# Patient Record
Sex: FEMALE | Race: WHITE | NOT HISPANIC OR LATINO | Employment: OTHER | URBAN - METROPOLITAN AREA
[De-identification: names, ages, dates, MRNs, and addresses within clinical notes are randomized per-mention and may not be internally consistent; named-entity substitution may affect disease eponyms.]

---

## 2020-01-01 ENCOUNTER — APPOINTMENT (OUTPATIENT)
Dept: GENERAL RADIOLOGY | Facility: HOSPITAL | Age: 85
End: 2020-01-01

## 2020-01-01 ENCOUNTER — APPOINTMENT (OUTPATIENT)
Dept: CT IMAGING | Facility: HOSPITAL | Age: 85
End: 2020-01-01

## 2020-01-01 ENCOUNTER — APPOINTMENT (OUTPATIENT)
Dept: CARDIOLOGY | Facility: HOSPITAL | Age: 85
End: 2020-01-01

## 2020-01-01 ENCOUNTER — HOSPITAL ENCOUNTER (INPATIENT)
Facility: HOSPITAL | Age: 85
LOS: 1 days | End: 2020-04-06
Attending: EMERGENCY MEDICINE | Admitting: INTERNAL MEDICINE

## 2020-01-01 VITALS
HEART RATE: 110 BPM | SYSTOLIC BLOOD PRESSURE: 97 MMHG | HEIGHT: 66 IN | BODY MASS INDEX: 20.57 KG/M2 | TEMPERATURE: 98.4 F | OXYGEN SATURATION: 84 % | WEIGHT: 128 LBS | DIASTOLIC BLOOD PRESSURE: 51 MMHG | RESPIRATION RATE: 21 BRPM

## 2020-01-01 DIAGNOSIS — R10.84 GENERALIZED ABDOMINAL PAIN: Primary | ICD-10-CM

## 2020-01-01 DIAGNOSIS — K56.600 PARTIAL SMALL BOWEL OBSTRUCTION (HCC): ICD-10-CM

## 2020-01-01 DIAGNOSIS — Z51.5 HOSPICE CARE: ICD-10-CM

## 2020-01-01 DIAGNOSIS — R73.9 HYPERGLYCEMIA: ICD-10-CM

## 2020-01-01 DIAGNOSIS — R93.5 ABNORMAL CT OF THE ABDOMEN: ICD-10-CM

## 2020-01-01 DIAGNOSIS — D72.829 LEUKOCYTOSIS, UNSPECIFIED TYPE: ICD-10-CM

## 2020-01-01 DIAGNOSIS — I48.91 NEW ONSET ATRIAL FIBRILLATION (HCC): ICD-10-CM

## 2020-01-01 DIAGNOSIS — N28.9 RENAL INSUFFICIENCY: ICD-10-CM

## 2020-01-01 LAB
ALBUMIN SERPL-MCNC: 3.3 G/DL (ref 3.5–5.2)
ALBUMIN SERPL-MCNC: 4.7 G/DL (ref 3.5–5.2)
ALBUMIN/GLOB SERPL: 1.3 G/DL
ALBUMIN/GLOB SERPL: 1.7 G/DL
ALP SERPL-CCNC: 104 U/L (ref 39–117)
ALP SERPL-CCNC: 96 U/L (ref 39–117)
ALT SERPL W P-5'-P-CCNC: 10 U/L (ref 1–33)
ALT SERPL W P-5'-P-CCNC: 38 U/L (ref 1–33)
ANION GAP SERPL CALCULATED.3IONS-SCNC: 16 MMOL/L (ref 5–15)
ANION GAP SERPL CALCULATED.3IONS-SCNC: 25 MMOL/L (ref 5–15)
APTT PPP: 31.5 SECONDS (ref 24–37)
APTT PPP: 34.6 SECONDS (ref 50–95)
ARTERIAL PATENCY WRIST A: ABNORMAL
AST SERPL-CCNC: 19 U/L (ref 1–32)
AST SERPL-CCNC: 65 U/L (ref 1–32)
ATMOSPHERIC PRESS: ABNORMAL MM[HG]
BACTERIA UR QL AUTO: ABNORMAL /HPF
BASE EXCESS BLDA CALC-SCNC: -19.7 MMOL/L (ref 0–2)
BASOPHILS # BLD MANUAL: 0 10*3/MM3 (ref 0–0.2)
BASOPHILS NFR BLD AUTO: 0 % (ref 0–1.5)
BDY SITE: ABNORMAL
BH CV ECHO MEAS - AO MAX PG (FULL): -0.7 MMHG
BH CV ECHO MEAS - AO MAX PG: 11 MMHG
BH CV ECHO MEAS - AO MEAN PG (FULL): -0.79 MMHG
BH CV ECHO MEAS - AO MEAN PG: 5 MMHG
BH CV ECHO MEAS - AO ROOT AREA (BSA CORRECTED): 2
BH CV ECHO MEAS - AO ROOT AREA: 8.4 CM^2
BH CV ECHO MEAS - AO ROOT DIAM: 3.3 CM
BH CV ECHO MEAS - AO V2 MAX: 165.9 CM/SEC
BH CV ECHO MEAS - AO V2 MEAN: 100.5 CM/SEC
BH CV ECHO MEAS - AO V2 VTI: 27.3 CM
BH CV ECHO MEAS - AVA(I,A): 2.3 CM^2
BH CV ECHO MEAS - AVA(I,D): 2.3 CM^2
BH CV ECHO MEAS - AVA(V,A): 2.2 CM^2
BH CV ECHO MEAS - AVA(V,D): 2.2 CM^2
BH CV ECHO MEAS - BSA(HAYCOCK): 1.6 M^2
BH CV ECHO MEAS - BSA: 1.7 M^2
BH CV ECHO MEAS - BZI_BMI: 20.7 KILOGRAMS/M^2
BH CV ECHO MEAS - BZI_METRIC_HEIGHT: 167.6 CM
BH CV ECHO MEAS - BZI_METRIC_WEIGHT: 58.1 KG
BH CV ECHO MEAS - EDV(CUBED): 62.3 ML
BH CV ECHO MEAS - EDV(MOD-SP2): 32 ML
BH CV ECHO MEAS - EDV(MOD-SP4): 67 ML
BH CV ECHO MEAS - EDV(TEICH): 68.6 ML
BH CV ECHO MEAS - EF(CUBED): 69.4 %
BH CV ECHO MEAS - EF(MOD-BP): 76 %
BH CV ECHO MEAS - EF(MOD-SP2): 75 %
BH CV ECHO MEAS - EF(MOD-SP4): 71.6 %
BH CV ECHO MEAS - EF(TEICH): 61.6 %
BH CV ECHO MEAS - ESV(CUBED): 19.1 ML
BH CV ECHO MEAS - ESV(MOD-SP2): 8 ML
BH CV ECHO MEAS - ESV(MOD-SP4): 19 ML
BH CV ECHO MEAS - ESV(TEICH): 26.4 ML
BH CV ECHO MEAS - FS: 32.6 %
BH CV ECHO MEAS - IVS/LVPW: 1.1
BH CV ECHO MEAS - IVSD: 0.91 CM
BH CV ECHO MEAS - LA DIMENSION: 3.8 CM
BH CV ECHO MEAS - LA/AO: 1.2
BH CV ECHO MEAS - LAD MAJOR: 5.6 CM
BH CV ECHO MEAS - LAT PEAK E' VEL: 10.7 CM/SEC
BH CV ECHO MEAS - LATERAL E/E' RATIO: 10.4
BH CV ECHO MEAS - LV DIASTOLIC VOL/BSA (35-75): 40.5 ML/M^2
BH CV ECHO MEAS - LV MASS(C)D: 104.5 GRAMS
BH CV ECHO MEAS - LV MASS(C)DI: 63.2 GRAMS/M^2
BH CV ECHO MEAS - LV MAX PG: 11.7 MMHG
BH CV ECHO MEAS - LV MEAN PG: 5.7 MMHG
BH CV ECHO MEAS - LV SYSTOLIC VOL/BSA (12-30): 11.5 ML/M^2
BH CV ECHO MEAS - LV V1 MAX: 171.1 CM/SEC
BH CV ECHO MEAS - LV V1 MEAN: 110 CM/SEC
BH CV ECHO MEAS - LV V1 VTI: 29 CM
BH CV ECHO MEAS - LVIDD: 4 CM
BH CV ECHO MEAS - LVIDS: 2.7 CM
BH CV ECHO MEAS - LVLD AP2: 5.7 CM
BH CV ECHO MEAS - LVLD AP4: 6.6 CM
BH CV ECHO MEAS - LVLS AP2: 4.8 CM
BH CV ECHO MEAS - LVLS AP4: 5.4 CM
BH CV ECHO MEAS - LVOT AREA (M): 2.3 CM^2
BH CV ECHO MEAS - LVOT AREA: 2.2 CM^2
BH CV ECHO MEAS - LVOT DIAM: 1.7 CM
BH CV ECHO MEAS - LVPWD: 0.84 CM
BH CV ECHO MEAS - MED PEAK E' VEL: 6.8 CM/SEC
BH CV ECHO MEAS - MEDIAL E/E' RATIO: 16
BH CV ECHO MEAS - MV A MAX VEL: 29.6 CM/SEC
BH CV ECHO MEAS - MV DEC TIME: 0.22 SEC
BH CV ECHO MEAS - MV E MAX VEL: 111.6 CM/SEC
BH CV ECHO MEAS - MV E/A: 3.8
BH CV ECHO MEAS - RAP SYSTOLE: 3 MMHG
BH CV ECHO MEAS - RVSP: 36 MMHG
BH CV ECHO MEAS - SI(AO): 139.6 ML/M^2
BH CV ECHO MEAS - SI(CUBED): 26.1 ML/M^2
BH CV ECHO MEAS - SI(LVOT): 38 ML/M^2
BH CV ECHO MEAS - SI(MOD-SP2): 14.5 ML/M^2
BH CV ECHO MEAS - SI(MOD-SP4): 29 ML/M^2
BH CV ECHO MEAS - SI(TEICH): 25.5 ML/M^2
BH CV ECHO MEAS - SV(AO): 231 ML
BH CV ECHO MEAS - SV(CUBED): 43.2 ML
BH CV ECHO MEAS - SV(LVOT): 62.9 ML
BH CV ECHO MEAS - SV(MOD-SP2): 24 ML
BH CV ECHO MEAS - SV(MOD-SP4): 48 ML
BH CV ECHO MEAS - SV(TEICH): 42.2 ML
BH CV ECHO MEAS - TAPSE (>1.6): 1.9 CM2
BH CV ECHO MEAS - TR MAX PG: 33 MMHG
BH CV ECHO MEAS - TR MAX VEL: 286 CM/SEC
BH CV ECHO MEASUREMENTS AVERAGE E/E' RATIO: 12.75
BH CV VAS BP RIGHT ARM: NORMAL MMHG
BH CV XLRA - RV BASE: 3.3 CM
BH CV XLRA - RV LENGTH: 6.4 CM
BH CV XLRA - RV MID: 3 CM
BH CV XLRA - TDI S': 12.8 CM/SEC
BILIRUB SERPL-MCNC: 0.8 MG/DL (ref 0.2–1.2)
BILIRUB SERPL-MCNC: 0.9 MG/DL (ref 0.2–1.2)
BILIRUB UR QL STRIP: NEGATIVE
BLASTS NFR BLD MANUAL: 13 % (ref 0–0)
BLASTS NFR BLD MANUAL: 7 % (ref 0–0)
BLASTS NFR BLD MANUAL: 8.1 % (ref 0–0)
BODY TEMPERATURE: 37 C
BUN BLD-MCNC: 37 MG/DL (ref 8–23)
BUN BLD-MCNC: 53 MG/DL (ref 8–23)
BUN/CREAT SERPL: 31.2 (ref 7–25)
BUN/CREAT SERPL: 35.2 (ref 7–25)
CALCIUM SPEC-SCNC: 8.1 MG/DL (ref 8.6–10.5)
CALCIUM SPEC-SCNC: 9.5 MG/DL (ref 8.6–10.5)
CHLORIDE SERPL-SCNC: 104 MMOL/L (ref 98–107)
CHLORIDE SERPL-SCNC: 95 MMOL/L (ref 98–107)
CHOLEST SERPL-MCNC: 92 MG/DL (ref 0–200)
CLARITY UR: ABNORMAL
CO2 BLDA-SCNC: 10.2 MMOL/L (ref 22–33)
CO2 SERPL-SCNC: 23 MMOL/L (ref 22–29)
CO2 SERPL-SCNC: 7 MMOL/L (ref 22–29)
COHGB MFR BLD: 0.6 % (ref 0–2)
COLOR UR: YELLOW
CREAT BLD-MCNC: 1.05 MG/DL (ref 0.57–1)
CREAT BLD-MCNC: 1.7 MG/DL (ref 0.57–1)
CYTOLOGIST CVX/VAG CYTO: NORMAL
D-LACTATE SERPL-SCNC: 1.1 MMOL/L (ref 0.5–2)
D-LACTATE SERPL-SCNC: 2.3 MMOL/L (ref 0.5–2)
D-LACTATE SERPL-SCNC: 8.2 MMOL/L (ref 0.5–2)
DEPRECATED RDW RBC AUTO: 51.6 FL (ref 37–54)
DEPRECATED RDW RBC AUTO: 51.8 FL (ref 37–54)
DEPRECATED RDW RBC AUTO: 60.7 FL (ref 37–54)
EOSINOPHIL # BLD MANUAL: 0 10*3/MM3 (ref 0–0.4)
EOSINOPHIL NFR BLD MANUAL: 0 % (ref 0.3–6.2)
EPAP: 0
ERYTHROCYTE [DISTWIDTH] IN BLOOD BY AUTOMATED COUNT: 17.4 % (ref 12.3–15.4)
ERYTHROCYTE [DISTWIDTH] IN BLOOD BY AUTOMATED COUNT: 17.8 % (ref 12.3–15.4)
ERYTHROCYTE [DISTWIDTH] IN BLOOD BY AUTOMATED COUNT: 18.6 % (ref 12.3–15.4)
GFR SERPL CREATININE-BSD FRML MDRD: 29 ML/MIN/1.73
GFR SERPL CREATININE-BSD FRML MDRD: 50 ML/MIN/1.73
GFR SERPL CREATININE-BSD FRML MDRD: 61 ML/MIN/1.73
GIANT PLATELETS: ABNORMAL
GLOBULIN UR ELPH-MCNC: 2.6 GM/DL
GLOBULIN UR ELPH-MCNC: 2.7 GM/DL
GLUCOSE BLD-MCNC: 111 MG/DL (ref 65–99)
GLUCOSE BLD-MCNC: 147 MG/DL (ref 65–99)
GLUCOSE BLDC GLUCOMTR-MCNC: 108 MG/DL (ref 70–130)
GLUCOSE BLDC GLUCOMTR-MCNC: 183 MG/DL (ref 70–130)
GLUCOSE UR STRIP-MCNC: NEGATIVE MG/DL
HBA1C MFR BLD: 4.8 % (ref 4.8–5.6)
HCO3 BLDA-SCNC: 9.3 MMOL/L (ref 20–26)
HCT VFR BLD AUTO: 40.4 % (ref 34–46.6)
HCT VFR BLD AUTO: 43.1 % (ref 34–46.6)
HCT VFR BLD AUTO: 53.8 % (ref 34–46.6)
HCT VFR BLD CALC: 45.1 %
HDLC SERPL-MCNC: 38 MG/DL (ref 40–60)
HGB BLD-MCNC: 13 G/DL (ref 12–15.9)
HGB BLD-MCNC: 14.1 G/DL (ref 12–15.9)
HGB BLD-MCNC: 15.4 G/DL (ref 12–15.9)
HGB BLDA-MCNC: 14.7 G/DL (ref 14–18)
HGB UR QL STRIP.AUTO: NEGATIVE
HOLD SPECIMEN: NORMAL
HOLD SPECIMEN: NORMAL
HOROWITZ INDEX BLD+IHG-RTO: 80 %
HYALINE CASTS UR QL AUTO: ABNORMAL /LPF
INR PPP: 1.25 (ref 0.85–1.16)
INR PPP: 1.27 (ref 0.85–1.16)
IPAP: 0
KETONES UR QL STRIP: ABNORMAL
LACTATE HOLD SPECIMEN: NORMAL
LDLC SERPL CALC-MCNC: 41 MG/DL (ref 0–100)
LDLC/HDLC SERPL: 1.09 {RATIO}
LEFT ATRIUM VOLUME INDEX: 28.4 ML/M^2
LEFT ATRIUM VOLUME: 47 ML
LEUKOCYTE ESTERASE UR QL STRIP.AUTO: ABNORMAL
LIPASE SERPL-CCNC: 11 U/L (ref 13–60)
LV EF 2D ECHO EST: 65 %
LYMPHOCYTES # BLD MANUAL: 2.8 10*3/MM3 (ref 0.7–3.1)
LYMPHOCYTES # BLD MANUAL: 4.61 10*3/MM3 (ref 0.7–3.1)
LYMPHOCYTES # BLD MANUAL: 6.46 10*3/MM3 (ref 0.7–3.1)
LYMPHOCYTES NFR BLD MANUAL: 13 % (ref 19.6–45.3)
LYMPHOCYTES NFR BLD MANUAL: 13 % (ref 19.6–45.3)
LYMPHOCYTES NFR BLD MANUAL: 18.2 % (ref 19.6–45.3)
LYMPHOCYTES NFR BLD MANUAL: 2 % (ref 5–12)
LYMPHOCYTES NFR BLD MANUAL: 2 % (ref 5–12)
LYMPHOCYTES NFR BLD MANUAL: 5 % (ref 5–12)
Lab: ABNORMAL
MAGNESIUM SERPL-MCNC: 2.4 MG/DL (ref 1.6–2.4)
MAGNESIUM SERPL-MCNC: 2.6 MG/DL (ref 1.6–2.4)
MCH RBC QN AUTO: 26.6 PG (ref 26.6–33)
MCH RBC QN AUTO: 26.9 PG (ref 26.6–33)
MCH RBC QN AUTO: 27.3 PG (ref 26.6–33)
MCHC RBC AUTO-ENTMCNC: 28.6 G/DL (ref 31.5–35.7)
MCHC RBC AUTO-ENTMCNC: 32.2 G/DL (ref 31.5–35.7)
MCHC RBC AUTO-ENTMCNC: 32.7 G/DL (ref 31.5–35.7)
MCV RBC AUTO: 82.8 FL (ref 79–97)
MCV RBC AUTO: 83.4 FL (ref 79–97)
MCV RBC AUTO: 94.1 FL (ref 79–97)
METAMYELOCYTES NFR BLD MANUAL: 4 % (ref 0–0)
METAMYELOCYTES NFR BLD MANUAL: 8 % (ref 0–0)
METAMYELOCYTES NFR BLD MANUAL: 8.1 % (ref 0–0)
METHGB BLD QL: 0.9 % (ref 0–1.5)
MODALITY: ABNORMAL
MONOCYTES # BLD AUTO: 0.51 10*3/MM3 (ref 0.1–0.9)
MONOCYTES # BLD AUTO: 0.99 10*3/MM3 (ref 0.1–0.9)
MONOCYTES # BLD AUTO: 1.08 10*3/MM3 (ref 0.1–0.9)
MYELOCYTES NFR BLD MANUAL: 16.2 % (ref 0–0)
MYELOCYTES NFR BLD MANUAL: 34 % (ref 0–0)
MYELOCYTES NFR BLD MANUAL: 7 % (ref 0–0)
NEUTROPHILS # BLD AUTO: 11.53 10*3/MM3 (ref 1.7–7)
NEUTROPHILS # BLD AUTO: 13.55 10*3/MM3 (ref 1.7–7)
NEUTROPHILS # BLD AUTO: 9.94 10*3/MM3 (ref 1.7–7)
NEUTROPHILS NFR BLD MANUAL: 28.3 % (ref 42.7–76)
NEUTROPHILS NFR BLD MANUAL: 47 % (ref 42.7–76)
NEUTROPHILS NFR BLD MANUAL: 6 % (ref 42.7–76)
NEUTS BAND NFR BLD MANUAL: 14 % (ref 0–5)
NEUTS BAND NFR BLD MANUAL: 16 % (ref 0–5)
NEUTS BAND NFR BLD MANUAL: 17.2 % (ref 0–5)
NITRITE UR QL STRIP: NEGATIVE
NOTE: ABNORMAL
NOTIFIED BY: ABNORMAL
NOTIFIED WHO: ABNORMAL
NT-PROBNP SERPL-MCNC: 3635 PG/ML (ref 5–1800)
NT-PROBNP SERPL-MCNC: ABNORMAL PG/ML (ref 5–1800)
OXYHGB MFR BLDV: 82.2 % (ref 94–99)
PATH INTERP BLD-IMP: NORMAL
PAW @ PEAK INSP FLOW SETTING VENT: 0 CMH2O
PCO2 BLDA: 31.4 MM HG (ref 35–45)
PCO2 TEMP ADJ BLD: 31.4 MM HG (ref 35–45)
PH BLDA: 7.08 PH UNITS (ref 7.35–7.45)
PH UR STRIP.AUTO: <=5 [PH] (ref 5–8)
PH, TEMP CORRECTED: 7.08 PH UNITS
PHOSPHATE SERPL-MCNC: 3.7 MG/DL (ref 2.5–4.5)
PLAT MORPH BLD: NORMAL
PLAT MORPH BLD: NORMAL
PLATELET # BLD AUTO: 444 10*3/MM3 (ref 140–450)
PLATELET # BLD AUTO: 543 10*3/MM3 (ref 140–450)
PLATELET # BLD AUTO: 584 10*3/MM3 (ref 140–450)
PMV BLD AUTO: 10.2 FL (ref 6–12)
PMV BLD AUTO: 10.5 FL (ref 6–12)
PMV BLD AUTO: 10.6 FL (ref 6–12)
PO2 BLDA: 65.8 MM HG (ref 83–108)
PO2 TEMP ADJ BLD: 65.8 MM HG (ref 83–108)
POTASSIUM BLD-SCNC: 4 MMOL/L (ref 3.5–5.2)
POTASSIUM BLD-SCNC: 5 MMOL/L (ref 3.5–5.2)
PROMYELOCYTES NFR BLD MANUAL: 1 % (ref 0–0)
PROMYELOCYTES NFR BLD MANUAL: 1 % (ref 0–0)
PROMYELOCYTES NFR BLD MANUAL: 10 % (ref 0–0)
PROT SERPL-MCNC: 5.9 G/DL (ref 6–8.5)
PROT SERPL-MCNC: 7.4 G/DL (ref 6–8.5)
PROT UR QL STRIP: ABNORMAL
PROTHROMBIN TIME: 15.4 SECONDS (ref 11.5–14)
PROTHROMBIN TIME: 15.6 SECONDS (ref 11.5–14)
RBC # BLD AUTO: 4.88 10*6/MM3 (ref 3.77–5.28)
RBC # BLD AUTO: 5.17 10*6/MM3 (ref 3.77–5.28)
RBC # BLD AUTO: 5.72 10*6/MM3 (ref 3.77–5.28)
RBC # UR: ABNORMAL /HPF
RBC MORPH BLD: NORMAL
REF LAB TEST METHOD: ABNORMAL
SODIUM BLD-SCNC: 134 MMOL/L (ref 136–145)
SODIUM BLD-SCNC: 136 MMOL/L (ref 136–145)
SP GR UR STRIP: 1.02 (ref 1–1.03)
SQUAMOUS #/AREA URNS HPF: ABNORMAL /HPF
T4 FREE SERPL-MCNC: 1.2 NG/DL (ref 0.93–1.7)
TOTAL RATE: 0 BREATHS/MINUTE
TRIGL SERPL-MCNC: 63 MG/DL (ref 0–150)
TROPONIN T SERPL-MCNC: 0.02 NG/ML (ref 0–0.03)
TROPONIN T SERPL-MCNC: 0.04 NG/ML (ref 0–0.03)
TSH SERPL DL<=0.05 MIU/L-ACNC: 4.88 UIU/ML (ref 0.27–4.2)
UFH PPP CHRO-ACNC: 0.1 IU/ML (ref 0.3–0.7)
UFH PPP CHRO-ACNC: 0.1 IU/ML (ref 0.3–0.7)
UFH PPP CHRO-ACNC: 0.25 IU/ML (ref 0.3–0.7)
UFH PPP CHRO-ACNC: 0.29 IU/ML (ref 0.3–0.7)
UROBILINOGEN UR QL STRIP: ABNORMAL
VARIANT LYMPHS NFR BLD MANUAL: 1 % (ref 0–5)
VLDLC SERPL-MCNC: 12.6 MG/DL
WBC MORPH BLD: NORMAL
WBC NRBC COR # BLD: 21.51 10*3/MM3 (ref 3.4–10.8)
WBC NRBC COR # BLD: 25.37 10*3/MM3 (ref 3.4–10.8)
WBC NRBC COR # BLD: 49.7 10*3/MM3 (ref 3.4–10.8)
WBC UR QL AUTO: ABNORMAL /HPF
WHOLE BLOOD HOLD SPECIMEN: NORMAL
WHOLE BLOOD HOLD SPECIMEN: NORMAL

## 2020-01-01 PROCEDURE — 85025 COMPLETE CBC W/AUTO DIFF WBC: CPT | Performed by: EMERGENCY MEDICINE

## 2020-01-01 PROCEDURE — 80061 LIPID PANEL: CPT | Performed by: INTERNAL MEDICINE

## 2020-01-01 PROCEDURE — 83880 ASSAY OF NATRIURETIC PEPTIDE: CPT | Performed by: EMERGENCY MEDICINE

## 2020-01-01 PROCEDURE — 85610 PROTHROMBIN TIME: CPT | Performed by: EMERGENCY MEDICINE

## 2020-01-01 PROCEDURE — 85730 THROMBOPLASTIN TIME PARTIAL: CPT | Performed by: EMERGENCY MEDICINE

## 2020-01-01 PROCEDURE — 84484 ASSAY OF TROPONIN QUANT: CPT | Performed by: NURSE PRACTITIONER

## 2020-01-01 PROCEDURE — 84484 ASSAY OF TROPONIN QUANT: CPT | Performed by: EMERGENCY MEDICINE

## 2020-01-01 PROCEDURE — 99223 1ST HOSP IP/OBS HIGH 75: CPT | Performed by: INTERNAL MEDICINE

## 2020-01-01 PROCEDURE — 25010000002 PIPERACILLIN SOD-TAZOBACTAM PER 1 G

## 2020-01-01 PROCEDURE — 25010000002 HYDROMORPHONE PER 4 MG: Performed by: INTERNAL MEDICINE

## 2020-01-01 PROCEDURE — 83735 ASSAY OF MAGNESIUM: CPT | Performed by: EMERGENCY MEDICINE

## 2020-01-01 PROCEDURE — 85025 COMPLETE CBC W/AUTO DIFF WBC: CPT

## 2020-01-01 PROCEDURE — 85520 HEPARIN ASSAY: CPT

## 2020-01-01 PROCEDURE — 83605 ASSAY OF LACTIC ACID: CPT | Performed by: EMERGENCY MEDICINE

## 2020-01-01 PROCEDURE — 85060 BLOOD SMEAR INTERPRETATION: CPT | Performed by: EMERGENCY MEDICINE

## 2020-01-01 PROCEDURE — 25010000002 IOPAMIDOL 61 % SOLUTION: Performed by: EMERGENCY MEDICINE

## 2020-01-01 PROCEDURE — 84443 ASSAY THYROID STIM HORMONE: CPT | Performed by: EMERGENCY MEDICINE

## 2020-01-01 PROCEDURE — 87086 URINE CULTURE/COLONY COUNT: CPT | Performed by: EMERGENCY MEDICINE

## 2020-01-01 PROCEDURE — 80053 COMPREHEN METABOLIC PANEL: CPT | Performed by: EMERGENCY MEDICINE

## 2020-01-01 PROCEDURE — 25010000002 HYDROMORPHONE PER 4 MG: Performed by: EMERGENCY MEDICINE

## 2020-01-01 PROCEDURE — 85025 COMPLETE CBC W/AUTO DIFF WBC: CPT | Performed by: NURSE PRACTITIONER

## 2020-01-01 PROCEDURE — 74177 CT ABD & PELVIS W/CONTRAST: CPT

## 2020-01-01 PROCEDURE — 93306 TTE W/DOPPLER COMPLETE: CPT | Performed by: INTERNAL MEDICINE

## 2020-01-01 PROCEDURE — 25010000002 DIGOXIN PER 500 MCG: Performed by: NURSE PRACTITIONER

## 2020-01-01 PROCEDURE — 94799 UNLISTED PULMONARY SVC/PX: CPT

## 2020-01-01 PROCEDURE — 85007 BL SMEAR W/DIFF WBC COUNT: CPT

## 2020-01-01 PROCEDURE — 85007 BL SMEAR W/DIFF WBC COUNT: CPT | Performed by: EMERGENCY MEDICINE

## 2020-01-01 PROCEDURE — 71045 X-RAY EXAM CHEST 1 VIEW: CPT

## 2020-01-01 PROCEDURE — 83690 ASSAY OF LIPASE: CPT | Performed by: EMERGENCY MEDICINE

## 2020-01-01 PROCEDURE — 25010000002 HYDROMORPHONE PER 4 MG: Performed by: NURSE PRACTITIONER

## 2020-01-01 PROCEDURE — 82962 GLUCOSE BLOOD TEST: CPT

## 2020-01-01 PROCEDURE — 83605 ASSAY OF LACTIC ACID: CPT | Performed by: NURSE PRACTITIONER

## 2020-01-01 PROCEDURE — 82805 BLOOD GASES W/O2 SATURATION: CPT

## 2020-01-01 PROCEDURE — 99291 CRITICAL CARE FIRST HOUR: CPT | Performed by: FAMILY MEDICINE

## 2020-01-01 PROCEDURE — 93005 ELECTROCARDIOGRAM TRACING: CPT | Performed by: EMERGENCY MEDICINE

## 2020-01-01 PROCEDURE — 85730 THROMBOPLASTIN TIME PARTIAL: CPT

## 2020-01-01 PROCEDURE — 83735 ASSAY OF MAGNESIUM: CPT | Performed by: NURSE PRACTITIONER

## 2020-01-01 PROCEDURE — 87186 SC STD MICRODIL/AGAR DIL: CPT | Performed by: EMERGENCY MEDICINE

## 2020-01-01 PROCEDURE — 87088 URINE BACTERIA CULTURE: CPT | Performed by: EMERGENCY MEDICINE

## 2020-01-01 PROCEDURE — 0 DIATRIZOATE MEGLUMINE & SODIUM PER 1 ML: Performed by: EMERGENCY MEDICINE

## 2020-01-01 PROCEDURE — 36600 WITHDRAWAL OF ARTERIAL BLOOD: CPT

## 2020-01-01 PROCEDURE — 25010000002 ONDANSETRON PER 1 MG: Performed by: EMERGENCY MEDICINE

## 2020-01-01 PROCEDURE — 99284 EMERGENCY DEPT VISIT MOD MDM: CPT

## 2020-01-01 PROCEDURE — 85610 PROTHROMBIN TIME: CPT

## 2020-01-01 PROCEDURE — 25010000002 PIPERACILLIN SOD-TAZOBACTAM PER 1 G: Performed by: EMERGENCY MEDICINE

## 2020-01-01 PROCEDURE — 25010000002 FUROSEMIDE PER 20 MG: Performed by: FAMILY MEDICINE

## 2020-01-01 PROCEDURE — 87040 BLOOD CULTURE FOR BACTERIA: CPT | Performed by: EMERGENCY MEDICINE

## 2020-01-01 PROCEDURE — 93306 TTE W/DOPPLER COMPLETE: CPT

## 2020-01-01 PROCEDURE — 84100 ASSAY OF PHOSPHORUS: CPT | Performed by: INTERNAL MEDICINE

## 2020-01-01 PROCEDURE — 83880 ASSAY OF NATRIURETIC PEPTIDE: CPT | Performed by: NURSE PRACTITIONER

## 2020-01-01 PROCEDURE — 25010000002 HEPARIN (PORCINE) PER 1000 UNITS

## 2020-01-01 PROCEDURE — 85007 BL SMEAR W/DIFF WBC COUNT: CPT | Performed by: NURSE PRACTITIONER

## 2020-01-01 PROCEDURE — 25010000002 DIGOXIN PER 500 MCG: Performed by: INTERNAL MEDICINE

## 2020-01-01 PROCEDURE — 83036 HEMOGLOBIN GLYCOSYLATED A1C: CPT | Performed by: INTERNAL MEDICINE

## 2020-01-01 PROCEDURE — 80053 COMPREHEN METABOLIC PANEL: CPT | Performed by: NURSE PRACTITIONER

## 2020-01-01 PROCEDURE — 81001 URINALYSIS AUTO W/SCOPE: CPT | Performed by: EMERGENCY MEDICINE

## 2020-01-01 PROCEDURE — 84439 ASSAY OF FREE THYROXINE: CPT | Performed by: EMERGENCY MEDICINE

## 2020-01-01 PROCEDURE — 25010000002 HEPARIN (PORCINE) 25000-0.45 UT/250ML-% SOLUTION

## 2020-01-01 RX ORDER — DIGOXIN 0.25 MG/ML
250 INJECTION INTRAMUSCULAR; INTRAVENOUS ONCE
Status: COMPLETED | OUTPATIENT
Start: 2020-01-01 | End: 2020-01-01

## 2020-01-01 RX ORDER — SODIUM CHLORIDE 0.9 % (FLUSH) 0.9 %
10 SYRINGE (ML) INJECTION AS NEEDED
Status: DISCONTINUED | OUTPATIENT
Start: 2020-01-01 | End: 2020-01-01 | Stop reason: HOSPADM

## 2020-01-01 RX ORDER — HYDROMORPHONE HYDROCHLORIDE 2 MG/1
2 TABLET ORAL EVERY 6 HOURS PRN
Qty: 20 TABLET | Refills: 0 | Status: SHIPPED | OUTPATIENT
Start: 2020-01-01 | End: 2020-04-11

## 2020-01-01 RX ORDER — ONDANSETRON 2 MG/ML
4 INJECTION INTRAMUSCULAR; INTRAVENOUS ONCE
Status: COMPLETED | OUTPATIENT
Start: 2020-01-01 | End: 2020-01-01

## 2020-01-01 RX ORDER — HEPARIN SODIUM 1000 [USP'U]/ML
3000 INJECTION, SOLUTION INTRAVENOUS; SUBCUTANEOUS ONCE
Status: COMPLETED | OUTPATIENT
Start: 2020-01-01 | End: 2020-01-01

## 2020-01-01 RX ORDER — BISACODYL 10 MG
10 SUPPOSITORY, RECTAL RECTAL DAILY
Status: DISCONTINUED | OUTPATIENT
Start: 2020-01-01 | End: 2020-01-01 | Stop reason: HOSPADM

## 2020-01-01 RX ORDER — HYDROMORPHONE HYDROCHLORIDE 1 MG/ML
0.25 INJECTION, SOLUTION INTRAMUSCULAR; INTRAVENOUS; SUBCUTANEOUS ONCE
Status: COMPLETED | OUTPATIENT
Start: 2020-01-01 | End: 2020-01-01

## 2020-01-01 RX ORDER — HYDROMORPHONE HYDROCHLORIDE 1 MG/ML
0.5 INJECTION, SOLUTION INTRAMUSCULAR; INTRAVENOUS; SUBCUTANEOUS AS NEEDED
Status: DISCONTINUED | OUTPATIENT
Start: 2020-01-01 | End: 2020-01-01

## 2020-01-01 RX ORDER — HYDROMORPHONE HYDROCHLORIDE 1 MG/ML
0.5 INJECTION, SOLUTION INTRAMUSCULAR; INTRAVENOUS; SUBCUTANEOUS EVERY 6 HOURS PRN
Status: DISCONTINUED | OUTPATIENT
Start: 2020-01-01 | End: 2020-01-01 | Stop reason: HOSPADM

## 2020-01-01 RX ORDER — DILTIAZEM HCL IN NACL,ISO-OSM 125 MG/125
5-15 PLASTIC BAG, INJECTION (ML) INTRAVENOUS CONTINUOUS
Status: DISCONTINUED | OUTPATIENT
Start: 2020-01-01 | End: 2020-01-01

## 2020-01-01 RX ORDER — HEPARIN SODIUM 1000 [USP'U]/ML
2000 INJECTION, SOLUTION INTRAVENOUS; SUBCUTANEOUS ONCE
Status: COMPLETED | OUTPATIENT
Start: 2020-01-01 | End: 2020-01-01

## 2020-01-01 RX ORDER — ACETAMINOPHEN 325 MG/1
650 TABLET ORAL EVERY 4 HOURS PRN
Status: DISCONTINUED | OUTPATIENT
Start: 2020-01-01 | End: 2020-01-01 | Stop reason: HOSPADM

## 2020-01-01 RX ORDER — HEPARIN SODIUM 10000 [USP'U]/100ML
16 INJECTION, SOLUTION INTRAVENOUS
Status: DISCONTINUED | OUTPATIENT
Start: 2020-01-01 | End: 2020-01-01 | Stop reason: HOSPADM

## 2020-01-01 RX ORDER — SODIUM CHLORIDE 9 MG/ML
125 INJECTION, SOLUTION INTRAVENOUS CONTINUOUS
Status: DISCONTINUED | OUTPATIENT
Start: 2020-01-01 | End: 2020-01-01 | Stop reason: HOSPADM

## 2020-01-01 RX ORDER — ASPIRIN 81 MG/1
81 TABLET, CHEWABLE ORAL DAILY
COMMUNITY
End: 2020-01-01 | Stop reason: HOSPADM

## 2020-01-01 RX ORDER — ACETAMINOPHEN 325 MG/1
650 TABLET ORAL EVERY 6 HOURS PRN
COMMUNITY
End: 2020-01-01 | Stop reason: HOSPADM

## 2020-01-01 RX ORDER — FUROSEMIDE 10 MG/ML
20 INJECTION INTRAMUSCULAR; INTRAVENOUS ONCE
Status: COMPLETED | OUTPATIENT
Start: 2020-01-01 | End: 2020-01-01

## 2020-01-01 RX ORDER — DIPHENHYDRAMINE HYDROCHLORIDE 50 MG/ML
12.5 INJECTION INTRAMUSCULAR; INTRAVENOUS ONCE
Status: DISCONTINUED | OUTPATIENT
Start: 2020-01-01 | End: 2020-01-01

## 2020-01-01 RX ORDER — SODIUM CHLORIDE 0.9 % (FLUSH) 0.9 %
10 SYRINGE (ML) INJECTION EVERY 12 HOURS SCHEDULED
Status: DISCONTINUED | OUTPATIENT
Start: 2020-01-01 | End: 2020-01-01 | Stop reason: HOSPADM

## 2020-01-01 RX ADMIN — SODIUM BICARBONATE 150 MEQ: 84 INJECTION, SOLUTION INTRAVENOUS at 06:36

## 2020-01-01 RX ADMIN — SODIUM CHLORIDE 125 ML/HR: 9 INJECTION, SOLUTION INTRAVENOUS at 10:16

## 2020-01-01 RX ADMIN — IOPAMIDOL 85 ML: 612 INJECTION, SOLUTION INTRAVENOUS at 09:25

## 2020-01-01 RX ADMIN — DIATRIZOATE MEGLUMINE AND DIATRIZOATE SODIUM 15 ML: 660; 100 LIQUID ORAL; RECTAL at 07:49

## 2020-01-01 RX ADMIN — TAZOBACTAM SODIUM AND PIPERACILLIN SODIUM 3.38 G: 375; 3 INJECTION, SOLUTION INTRAVENOUS at 01:22

## 2020-01-01 RX ADMIN — DIGOXIN 250 MCG: 0.25 INJECTION INTRAMUSCULAR; INTRAVENOUS at 20:58

## 2020-01-01 RX ADMIN — HYDROMORPHONE HYDROCHLORIDE 0.5 MG: 1 INJECTION, SOLUTION INTRAMUSCULAR; INTRAVENOUS; SUBCUTANEOUS at 08:38

## 2020-01-01 RX ADMIN — SODIUM CHLORIDE 1000 ML: 9 INJECTION, SOLUTION INTRAVENOUS at 07:49

## 2020-01-01 RX ADMIN — HYDROMORPHONE HYDROCHLORIDE 0.5 MG: 1 INJECTION, SOLUTION INTRAMUSCULAR; INTRAVENOUS; SUBCUTANEOUS at 22:34

## 2020-01-01 RX ADMIN — BISACODYL 10 MG: 10 SUPPOSITORY RECTAL at 13:01

## 2020-01-01 RX ADMIN — DIGOXIN 250 MCG: 0.25 INJECTION INTRAMUSCULAR; INTRAVENOUS at 01:21

## 2020-01-01 RX ADMIN — SODIUM CHLORIDE 125 ML/HR: 9 INJECTION, SOLUTION INTRAVENOUS at 02:06

## 2020-01-01 RX ADMIN — SODIUM CHLORIDE, PRESERVATIVE FREE 10 ML: 5 INJECTION INTRAVENOUS at 21:09

## 2020-01-01 RX ADMIN — ONDANSETRON 4 MG: 2 INJECTION INTRAMUSCULAR; INTRAVENOUS at 09:06

## 2020-01-01 RX ADMIN — HYDROMORPHONE HYDROCHLORIDE 0.5 MG: 1 INJECTION, SOLUTION INTRAMUSCULAR; INTRAVENOUS; SUBCUTANEOUS at 10:55

## 2020-01-01 RX ADMIN — HEPARIN SODIUM 2000 UNITS: 1000 INJECTION, SOLUTION INTRAVENOUS; SUBCUTANEOUS at 02:02

## 2020-01-01 RX ADMIN — SODIUM CHLORIDE, PRESERVATIVE FREE 10 ML: 5 INJECTION INTRAVENOUS at 08:39

## 2020-01-01 RX ADMIN — TAZOBACTAM SODIUM AND PIPERACILLIN SODIUM 3.38 G: 375; 3 INJECTION, SOLUTION INTRAVENOUS at 18:20

## 2020-01-01 RX ADMIN — TAZOBACTAM SODIUM AND PIPERACILLIN SODIUM 3.38 G: 375; 3 INJECTION, SOLUTION INTRAVENOUS at 09:43

## 2020-01-01 RX ADMIN — HEPARIN SODIUM 2000 UNITS: 1000 INJECTION, SOLUTION INTRAVENOUS; SUBCUTANEOUS at 21:25

## 2020-01-01 RX ADMIN — HYDROMORPHONE HYDROCHLORIDE 0.5 MG: 1 INJECTION, SOLUTION INTRAMUSCULAR; INTRAVENOUS; SUBCUTANEOUS at 15:06

## 2020-01-01 RX ADMIN — HYDROMORPHONE HYDROCHLORIDE 0.25 MG: 1 INJECTION, SOLUTION INTRAMUSCULAR; INTRAVENOUS; SUBCUTANEOUS at 09:37

## 2020-01-01 RX ADMIN — SODIUM CHLORIDE, PRESERVATIVE FREE 10 ML: 5 INJECTION INTRAVENOUS at 10:55

## 2020-01-01 RX ADMIN — SODIUM CHLORIDE 125 ML/HR: 9 INJECTION, SOLUTION INTRAVENOUS at 18:20

## 2020-01-01 RX ADMIN — FUROSEMIDE 20 MG: 10 INJECTION, SOLUTION INTRAMUSCULAR; INTRAVENOUS at 04:46

## 2020-01-01 RX ADMIN — Medication 15 MG/HR: at 01:55

## 2020-01-01 RX ADMIN — HEPARIN SODIUM 12 UNITS/KG/HR: 10000 INJECTION, SOLUTION INTRAVENOUS at 13:11

## 2020-01-01 RX ADMIN — ONDANSETRON 4 MG: 2 INJECTION INTRAMUSCULAR; INTRAVENOUS at 07:48

## 2020-01-01 RX ADMIN — Medication 5 MG/HR: at 10:28

## 2020-01-01 RX ADMIN — HEPARIN SODIUM 3000 UNITS: 1000 INJECTION, SOLUTION INTRAVENOUS; SUBCUTANEOUS at 13:51

## 2020-01-01 RX ADMIN — HYDROMORPHONE HYDROCHLORIDE: 1 INJECTION, SOLUTION INTRAMUSCULAR; INTRAVENOUS; SUBCUTANEOUS at 01:41

## 2020-01-01 RX ADMIN — SODIUM CHLORIDE, PRESERVATIVE FREE 10 ML: 5 INJECTION INTRAVENOUS at 13:01

## 2020-04-05 PROBLEM — R10.84 GENERALIZED ABDOMINAL PAIN: Status: ACTIVE | Noted: 2020-01-01

## 2020-04-05 PROBLEM — I48.0 PAROXYSMAL ATRIAL FIBRILLATION WITH RAPID VENTRICULAR RESPONSE (HCC): Status: ACTIVE | Noted: 2020-01-01

## 2020-04-05 PROBLEM — K56.609 SBO (SMALL BOWEL OBSTRUCTION) (HCC): Status: ACTIVE | Noted: 2020-01-01

## 2020-04-05 PROBLEM — D73.89 SPLENIC LESION: Status: ACTIVE | Noted: 2020-01-01

## 2020-04-05 NOTE — ED PROVIDER NOTES
Subjective   Baldev Canseco is an 84 y.o.female who presents to the ED with complaints of generalized abdominal pain. The patient has been experiencing abdominal pain for the past four days. Prior to the onset of her pain, she states she ate canned soup. She has not taken any medication for her pain. She also complains of feeling nauseated. She denies any vomiting, sore throat, rhinorrhea, chest pain, palpitations, shortness of breath, cough, extremity pain, dysuria, hematuria, melena, fever, or change in frequency/urgency of urination. She does admit to feeling constipated, as she has been unable to void her bowels for four days. Additionally, she has a history of a hysterectomy, appendectomy, and a hysterectomy. There are no other complaints at this time.       History provided by:  Patient  Abdominal Pain   Pain location:  Generalized  Pain quality: aching    Pain radiates to:  Does not radiate  Pain severity:  Moderate  Onset quality:  Sudden  Duration:  4 days  Timing:  Constant  Progression:  Unchanged  Chronicity:  New  Relieved by:  Nothing  Worsened by:  Nothing  Ineffective treatments:  None tried  Associated symptoms: constipation and nausea    Associated symptoms: no chest pain, no cough, no diarrhea, no dysuria, no fever, no hematochezia, no melena, no shortness of breath, no sore throat and no vomiting        Review of Systems   Constitutional: Negative for fever.   HENT: Negative for rhinorrhea and sore throat.    Respiratory: Negative for cough and shortness of breath.    Cardiovascular: Negative for chest pain and palpitations.   Gastrointestinal: Positive for abdominal pain, constipation and nausea. Negative for diarrhea, hematochezia, melena and vomiting.   Genitourinary: Negative for dysuria, frequency and urgency.   Musculoskeletal:        No extremity pain.    All other systems reviewed and are negative.      History reviewed. No pertinent past medical history.    No Known Allergies    Past  Surgical History:   Procedure Laterality Date   • HYSTERECTOMY         History reviewed. No pertinent family history.    Social History     Socioeconomic History   • Marital status:      Spouse name: Not on file   • Number of children: Not on file   • Years of education: Not on file   • Highest education level: Not on file   Tobacco Use   • Smoking status: Never Smoker   Substance and Sexual Activity   • Alcohol use: Never     Frequency: Never   • Drug use: Never   • Sexual activity: Defer         Objective   Physical Exam   Constitutional: She is oriented to person, place, and time. She appears well-developed and well-nourished.   HENT:   Head: Normocephalic and atraumatic.   Eyes: Conjunctivae are normal. No scleral icterus.   Neck: Normal range of motion. Neck supple.   Cardiovascular: Normal heart sounds and intact distal pulses. An irregularly irregular rhythm present. Tachycardia present.   No murmur heard.  Heart rate is between 130 and 150.    Pulmonary/Chest: Effort normal and breath sounds normal. No respiratory distress.   Abdominal: Soft. She exhibits distension (mild). Bowel sounds are decreased. There is generalized tenderness (mild).   Diminished bowel sounds with high pitched sounds.    Musculoskeletal: Normal range of motion. She exhibits no edema.   Neurological: She is alert and oriented to person, place, and time.   Skin: Skin is warm and dry.   Psychiatric: She has a normal mood and affect. Her behavior is normal.   Nursing note and vitals reviewed.      Procedures         ED Course  ED Course as of Apr 05 1241   Sun Apr 05, 2020   0749 COVID-19 RISK SCREEN    Has the patient had close contact without PPE with a lab confirmed COVID-19 (+) person or a person under investigation (PUI) for COVID-19 infection?  -- No     Has the patient provided care or had close contact with COVID-19(+) patient in a healthcare facility? --  No         [TB]   0785 I discussed findings with the patient.  She  has A. fib with tachycardia.  She denies ever having irregular heartbeat in the past.  She has no palpitation and there is no indication of how long she has been in A. fib.  She has abdominal tenderness.  We will proceed with a CT scan of the abdomen pelvis.  She reports that she is keeping liquids down well.  We will check her creatinine and attempt to get an orally and IV contrasted CT to look for significant intra-abdominal pathology as the differential is broad.She denies any coronavirus risk factors or significant coronavirus symptoms.She reports seeing physicians very infrequently.  I discussed the evaluation in the ED.  Patient verbalized understanding agreement plan of care.    []   0848 I discussed findings today with the patient.  I discussed her UTI elevated white count and other laboratory abnormalities.  Her heart rate is quite variable, and is significantly improved from presentation.  She does have P waves present so it may be atrial flutter as opposed to A. fib.  We will repeat an EKG    []   0951 Discussed with Dr. Cruz, hospitalist, who will admit.     [TB]      ED Course User Index  [HH] Glenn Hassan MD  [TB] Werner Rose     Recent Results (from the past 24 hour(s))   Comprehensive Metabolic Panel    Collection Time: 04/05/20  7:40 AM   Result Value Ref Range    Glucose 147 (H) 65 - 99 mg/dL    BUN 37 (H) 8 - 23 mg/dL    Creatinine 1.05 (H) 0.57 - 1.00 mg/dL    Sodium 134 (L) 136 - 145 mmol/L    Potassium 4.0 3.5 - 5.2 mmol/L    Chloride 95 (L) 98 - 107 mmol/L    CO2 23.0 22.0 - 29.0 mmol/L    Calcium 9.5 8.6 - 10.5 mg/dL    Total Protein 7.4 6.0 - 8.5 g/dL    Albumin 4.70 3.50 - 5.20 g/dL    ALT (SGPT) 10 1 - 33 U/L    AST (SGOT) 19 1 - 32 U/L    Alkaline Phosphatase 96 39 - 117 U/L    Total Bilirubin 0.8 0.2 - 1.2 mg/dL    eGFR Non African Amer 50 (L) >60 mL/min/1.73    eGFR  African Amer 61 >60 mL/min/1.73    Globulin 2.7 gm/dL    A/G Ratio 1.7 g/dL    BUN/Creatinine Ratio 35.2  (H) 7.0 - 25.0    Anion Gap 16.0 (H) 5.0 - 15.0 mmol/L   Lipase    Collection Time: 04/05/20  7:40 AM   Result Value Ref Range    Lipase 11 (L) 13 - 60 U/L   Troponin    Collection Time: 04/05/20  7:40 AM   Result Value Ref Range    Troponin T 0.018 0.000 - 0.030 ng/mL   Lactic Acid, Plasma    Collection Time: 04/05/20  7:40 AM   Result Value Ref Range    Lactate 2.3 (C) 0.5 - 2.0 mmol/L   Light Blue Top    Collection Time: 04/05/20  7:40 AM   Result Value Ref Range    Extra Tube hold for add-on    Green Top (Gel)    Collection Time: 04/05/20  7:40 AM   Result Value Ref Range    Extra Tube Hold for add-ons.    Lavender Top    Collection Time: 04/05/20  7:40 AM   Result Value Ref Range    Extra Tube hold for add-on    Gold Top - SST    Collection Time: 04/05/20  7:40 AM   Result Value Ref Range    Extra Tube Hold for add-ons.    CBC Auto Differential    Collection Time: 04/05/20  7:40 AM   Result Value Ref Range    WBC 25.37 (H) 3.40 - 10.80 10*3/mm3    RBC 5.17 3.77 - 5.28 10*6/mm3    Hemoglobin 14.1 12.0 - 15.9 g/dL    Hematocrit 43.1 34.0 - 46.6 %    MCV 83.4 79.0 - 97.0 fL    MCH 27.3 26.6 - 33.0 pg    MCHC 32.7 31.5 - 35.7 g/dL    RDW 17.8 (H) 12.3 - 15.4 %    RDW-SD 51.6 37.0 - 54.0 fl    MPV 10.2 6.0 - 12.0 fL    Platelets 543 (H) 140 - 450 10*3/mm3   T4, Free    Collection Time: 04/05/20  7:40 AM   Result Value Ref Range    Free T4 1.20 0.93 - 1.70 ng/dL   TSH    Collection Time: 04/05/20  7:40 AM   Result Value Ref Range    TSH 4.880 (H) 0.270 - 4.200 uIU/mL   Magnesium    Collection Time: 04/05/20  7:40 AM   Result Value Ref Range    Magnesium 2.4 1.6 - 2.4 mg/dL   Protime-INR    Collection Time: 04/05/20  7:40 AM   Result Value Ref Range    Protime 15.6 (H) 11.5 - 14.0 Seconds    INR 1.27 (H) 0.85 - 1.16   aPTT    Collection Time: 04/05/20  7:40 AM   Result Value Ref Range    PTT 31.5 24.0 - 37.0 seconds   BNP    Collection Time: 04/05/20  7:40 AM   Result Value Ref Range    proBNP 3,635.0 (H) 5.0-1,800.0  pg/mL   Lactic Acid, Reflex Timer (This will reflex a repeat order 3-3:15 hours after ordered.)    Collection Time: 04/05/20  7:40 AM   Result Value Ref Range    Hold Tube Hold for add-ons.    Hemoglobin A1c    Collection Time: 04/05/20  7:40 AM   Result Value Ref Range    Hemoglobin A1C 4.80 4.80 - 5.60 %   Phosphorus    Collection Time: 04/05/20  7:40 AM   Result Value Ref Range    Phosphorus 3.7 2.5 - 4.5 mg/dL   Protime-INR    Collection Time: 04/05/20  7:40 AM   Result Value Ref Range    Protime 15.4 (H) 11.5 - 14.0 Seconds    INR 1.25 (H) 0.85 - 1.16   aPTT    Collection Time: 04/05/20  7:40 AM   Result Value Ref Range    PTT 34.6 (L) 50.0 - 95.0 seconds   Urinalysis With Culture If Indicated - Urine, Clean Catch    Collection Time: 04/05/20  8:03 AM   Result Value Ref Range    Color, UA Yellow Yellow, Straw    Appearance, UA Cloudy (A) Clear    pH, UA <=5.0 5.0 - 8.0    Specific Gravity, UA 1.018 1.001 - 1.030    Glucose, UA Negative Negative    Ketones, UA Trace (A) Negative    Bilirubin, UA Negative Negative    Blood, UA Negative Negative    Protein, UA 30 mg/dL (1+) (A) Negative    Leuk Esterase, UA Small (1+) (A) Negative    Nitrite, UA Negative Negative    Urobilinogen, UA 0.2 E.U./dL 0.2 - 1.0 E.U./dL   Urinalysis, Microscopic Only - Urine, Clean Catch    Collection Time: 04/05/20  8:03 AM   Result Value Ref Range    RBC, UA 0-2 None Seen, 0-2 /HPF    WBC, UA 6-12 (A) None Seen, 0-2 /HPF    Bacteria, UA 4+ (A) None Seen, Trace /HPF    Squamous Epithelial Cells, UA 0-2 None Seen, 0-2 /HPF    Hyaline Casts, UA 7-12 0 - 6 /LPF    Methodology Automated Microscopy    CBC Auto Differential    Collection Time: 04/05/20 11:49 AM   Result Value Ref Range    WBC 21.51 (H) 3.40 - 10.80 10*3/mm3    RBC 4.88 3.77 - 5.28 10*6/mm3    Hemoglobin 13.0 12.0 - 15.9 g/dL    Hematocrit 40.4 34.0 - 46.6 %    MCV 82.8 79.0 - 97.0 fL    MCH 26.6 26.6 - 33.0 pg    MCHC 32.2 31.5 - 35.7 g/dL    RDW 17.4 (H) 12.3 - 15.4 %     RDW-SD 51.8 37.0 - 54.0 fl    MPV 10.6 6.0 - 12.0 fL    Platelets 444 140 - 450 10*3/mm3   Lactic Acid, Reflex    Collection Time: 04/05/20 11:49 AM   Result Value Ref Range    Lactate 1.1 0.5 - 2.0 mmol/L   Transthoracic Echo Complete With Contrast if Necessary Per Protocol    Collection Time: 04/05/20 12:28 PM   Result Value Ref Range    BSA 1.7 m^2     CV ECHO DANDRE - BZI_BMI 20.7 kilograms/m^2     CV ECHO DANDRE - BSA(HAYCOCK) 1.6 m^2     CV ECHO DANDRE - BZI_METRIC_WEIGHT 58.1 kg     CV ECHO DANDRE - BZI_METRIC_HEIGHT 167.6 cm    IVSd 0.91 cm    LVIDd 4.0 cm    LVIDs 2.7 cm    LVPWd 0.84 cm    IVS/LVPW 1.1     FS 32.6 %    EDV(Teich) 68.6 ml    ESV(Teich) 26.4 ml    EF(Teich) 61.6 %    EDV(cubed) 62.3 ml    ESV(cubed) 19.1 ml    EF(cubed) 69.4 %    LV mass(C)d 104.5 grams    LV mass(C)dI 63.2 grams/m^2    SV(Teich) 42.2 ml    SI(Teich) 25.5 ml/m^2    SV(cubed) 43.2 ml    SI(cubed) 26.1 ml/m^2    Ao root diam 3.3 cm    Ao root area 8.4 cm^2    LA dimension 3.8 cm    LA/Ao 1.2     LVOT diam 1.7 cm    LVOT area 2.2 cm^2    LVOT area(traced) 2.3 cm^2    LAd major 5.6 cm    LVLd ap4 6.6 cm    EDV(MOD-sp4) 67.0 ml    LVLs ap4 5.4 cm    ESV(MOD-sp4) 19.0 ml    EF(MOD-sp4) 71.6 %    LVLd ap2 5.7 cm    EDV(MOD-sp2) 32.0 ml    LVLs ap2 4.8 cm    ESV(MOD-sp2) 8.0 ml    EF(MOD-sp2) 75.0 %    LA volume 47.0 ml    EF(MOD-bp) 76.0 %    SV(MOD-sp4) 48.0 ml    SI(MOD-sp4) 29.0 ml/m^2    SV(MOD-sp2) 24.0 ml    SI(MOD-sp2) 14.5 ml/m^2    Ao root area (BSA corrected) 2.0     LV Ng Vol (BSA corrected) 40.5 ml/m^2    LV Sys Vol (BSA corrected) 11.5 ml/m^2    LA Volume Index 28.4 ml/m^2    MV E max eyal 111.6 cm/sec    MV A max eyal 29.6 cm/sec    MV E/A 3.8     MV dec time 0.22 sec    Ao pk eyal 165.9 cm/sec    Ao max PG 11.0 mmHg    Ao max PG (full) -0.7 mmHg    Ao V2 mean 100.5 cm/sec    Ao mean PG 5.0 mmHg    Ao mean PG (full) -0.79 mmHg    Ao V2 VTI 27.3 cm    AMINA(I,A) 2.3 cm^2    AMINA(I,D) 2.3 cm^2    AMINA(V,A) 2.2 cm^2     AMINA(V,D) 2.2 cm^2    LV V1 max PG 11.7 mmHg    LV V1 mean PG 5.7 mmHg    LV V1 max 171.1 cm/sec    LV V1 mean 110.0 cm/sec    LV V1 VTI 29.0 cm    SV(Ao) 231.0 ml    SI(Ao) 139.6 ml/m^2    SV(LVOT) 62.9 ml    SI(LVOT) 38.0 ml/m^2    TR max nitish 286 cm/sec    TR max PG 33 mmHg    Lat E/e'  10.4     Med E/e' 16.0     Lat Peak E' Nitish 10.7 cm/sec    Med Peak E' Nitish 6.8 cm/sec    Avg E/e' ratio 12.75     BH CV VAS BP RIGHT /67 mmHg    TDI S' 12.80 cm/sec    RV Base 3.30 cm    RV Length 6.40 cm    RV Mid 3.00 cm    RAP systole 3 mmHg    RVSP(TR) 36 mmHg    TAPSE (>1.6) 1.90 cm2     Note: In addition to lab results from this visit, the labs listed above may include labs taken at another facility or during a different encounter within the last 24 hours. Please correlate lab times with ED admission and discharge times for further clarification of the services performed during this visit.    CT Abdomen Pelvis With Contrast   Preliminary Result   1. Atelectatic changes and tiny bilateral pleural effusions with no   definite pneumonia at the lung bases.   2. Findings concerning for partial small bowel obstruction with dilated   fluid-filled loops of small bowel seen diffusely throughout the abdomen   and pelvis with oral contrast only seen within the proximal portions of   the small bowel. The transition point is not well seen on the   examination. There is stool seen within the colon. There is no   significant wall thickening identified of the bowel.   3. Free fluid seen within the upper abdomen and pelvis with enlargement   of the spleen and heterogeneity of the spleen for which underlying   lesions in the spleen cannot be completely excluded. No well-defined   mass identified on delayed imaging.   4. Hiatal hernia with vascular calcification and degenerative changes   seen within the bony skeleton.       DICTATED:   04/05/2020   EDITED/ls :   04/05/2020           XR Chest 1 View   Preliminary Result   Lung volumes are  "low with mild increased markings identified   at the lung bases bilaterally. Minimal degenerative changes seen within   the spine with vascular calcification seen in the thoracic aorta.       DICTATED:   04/05/2020   EDITED/ls :   04/05/2020             Vitals:    04/05/20 1145 04/05/20 1216 04/05/20 1232 04/05/20 1238   BP: 124/61   117/72   BP Location:    Right arm   Patient Position:    Lying   Pulse: 88   97   Resp:    24   Temp:       TempSrc:       SpO2: 92%      Weight:  58.1 kg (128 lb) 58.1 kg (128 lb)    Height:  167.6 cm (66\") 167.6 cm (66\")      Medications   sodium chloride 0.9 % flush 10 mL (has no administration in time range)   sodium chloride 0.9 % infusion (125 mL/hr Intravenous New Bag 4/5/20 1016)   dilTIAZem (CARDIZEM) 125 mg in 125 mL 0.7% sodium chloride (1 mg/mL) infusion (5 mg/hr Intravenous New Bag 4/5/20 1028)   sodium chloride 0.9 % flush 10 mL (has no administration in time range)   sodium chloride 0.9 % flush 10 mL (10 mL Intravenous Given 4/5/20 1055)   Pharmacy to Dose Heparin (has no administration in time range)   acetaminophen (TYLENOL) tablet 650 mg (has no administration in time range)   bisacodyl (DULCOLAX) suppository 10 mg (has no administration in time range)   Pharmacy to dose zosyn (has no administration in time range)   HYDROmorphone (DILAUDID) injection 0.5 mg (0.5 mg Intravenous Given 4/5/20 1055)   heparin 66910 units/250 mL (100 units/mL) in 0.45 % NaCl infusion (has no administration in time range)   piperacillin-tazobactam (ZOSYN) 3.375 g in iso-osmotic dextrose 50 ml (premix) (has no administration in time range)   piperacillin-tazobactam (ZOSYN) 3.375 g in iso-osmotic dextrose 50 ml (premix) (has no administration in time range)   diatrizoate meglumine-sodium (GASTROGRAFIN) 66-10 % solution 15 mL (15 mL Oral Given 4/5/20 0782)   sodium chloride 0.9 % bolus 1,000 mL (0 mL Intravenous Stopped 4/5/20 9931)   ondansetron (ZOFRAN) injection 4 mg (4 mg Intravenous Given " 4/5/20 0748)   ondansetron (ZOFRAN) injection 4 mg (4 mg Intravenous Given 4/5/20 0906)   HYDROmorphone (DILAUDID) injection 0.25 mg (0.25 mg Intravenous Given 4/5/20 0937)   piperacillin-tazobactam (ZOSYN) 3.375 g in iso-osmotic dextrose 50 ml (premix) (0 g Intravenous Stopped 4/5/20 0943)   iopamidol (ISOVUE-300) 61 % injection 85 mL (85 mL Intravenous Given 4/5/20 0925)   dilTIAZem (CARDIZEM) bolus from bag 1 mg/mL 10 mg (10 mg Intravenous Bolus from Bag 4/5/20 1027)     ECG/EMG Results (last 24 hours)     ** No results found for the last 24 hours. **        ECG 12 Lead   Final Result   Test Reason : Upper Abdominal Pain Triage Protocol   Blood Pressure : **/** mmHG   Vent. Rate : 138 BPM     Atrial Rate : 394 BPM      P-R Int : 000 ms          QRS Dur : 076 ms       QT Int : 300 ms       P-R-T Axes : 000 -08 108 degrees      QTc Int : 454 ms      Atrial fibrillation   Nonspecific ST and T wave abnormality   Abnormal ECG   No previous ECGs available   Confirmed by DORA HASSAN MD ([id number]) on 4/5/2020 7:48:55 AM      Referred By:  GM           Confirmed By:DORA HASSAN MD                                                 [facility]    Final diagnoses:   Generalized abdominal pain   New onset atrial fibrillation (CMS/HCC)   Leukocytosis, unspecified type   Renal insufficiency   Hyperglycemia   Partial small bowel obstruction (CMS/HCC)   Abnormal CT of the abdomen       Documentation assistance provided by triston Rose.  Information recorded by the scrfreddie was done at my direction and has been verified and validated by me.     Werner Rose  04/05/20 0766       Werner Rose  04/05/20 0837       Werner Rose  04/05/20 0907       Dora Hassan MD  04/05/20 6077

## 2020-04-05 NOTE — H&P
[facility] Medicine Services  HISTORY AND PHYSICAL    Patient Name: Baldev Canseco  : 1935  MRN: [id number]  Primary Care Physician: Provider, No Known  Date of admission: 2020      Subjective   Subjective     Chief Complaint:  Abdominal pain    HPI:  Baldev Canseco is a 84 y.o. female who has not seen a doctor since the early  presented to the ER today with complaints of abdominal pain, nausea and vomiting.  The patient states that since Thursday, she had been unable to have a bowel movement and unable to eat solid food.  She was able to drink liquids.  She does not take any medications as she has not seen a doctor for 30 years.  She is also found to be in A. fib with heart rate in the 140s, that improved with fluid resuscitation.  CT abdomen pelvis shows partial small bowel obstruction with enlarged spleen.  There are lesions on the spleen with mass-effect onto her left kidney.  She denies any urinary symptoms currently, denies any fevers or chills although her white count is 25.  Urine culture and blood cultures are pending    Review of Systems   Constitutional: Positive for appetite change and unexpected weight change. Negative for chills, fatigue and fever.   HENT: Negative for postnasal drip, sneezing and sore throat.    Respiratory: Negative for chest tightness, shortness of breath and wheezing.    Cardiovascular: Negative for chest pain, palpitations and leg swelling.   Gastrointestinal: Positive for abdominal distention, abdominal pain, constipation, nausea and vomiting.   Endocrine: Negative for cold intolerance and heat intolerance.   Genitourinary: Negative for dysuria, pelvic pain and urgency.   Skin: Negative for color change and rash.   Neurological: Negative for dizziness, syncope and light-headedness.   Psychiatric/Behavioral: Negative for agitation. The patient is not nervous/anxious.         All other systems reviewed and are negative.     Personal History      History reviewed. No pertinent past medical history.    Past Surgical History:   Procedure Laterality Date   • HYSTERECTOMY         Family History: family history is not on file. Otherwise pertinent FHx was reviewed and unremarkable.     Social History:  reports that she has never smoked. She does not have any smokeless tobacco history on file. She reports that she does not drink alcohol or use drugs.  Social History     Social History Narrative   • Not on file       Medications:  Available home medication information reviewed.    (Not in a hospital admission)    No Known Allergies    Objective   Objective     Vital Signs:   Temp:  [98 °F (36.7 °C)] 98 °F (36.7 °C)  Heart Rate:  [109-157] 118  Resp:  [16-22] 16  BP: (141-152)/() 144/89        Physical Exam   Constitutional: Awake, alert, elderly female  Eyes: PERRLA, sclerae anicteric, no conjunctival injection  HENT: NCAT, mucous membranes moist  Neck: Supple, trachea midline  Respiratory: Clear to auscultation bilaterally, nonlabored respirations   Cardiovascular: Irregular rhythm, tachycardic  Gastrointestinal: distended, tender, no guarding  Musculoskeletal: No bilateral ankle edema, no clubbing or cyanosis to extremities  Psychiatric: Appropriate affect, cooperative  Neurologic: Oriented x 3, strength symmetric in all extremities, Cranial Nerves grossly intact to confrontation, speech clear  Skin: No rashes    Results Reviewed:  I have personally reviewed current lab and radiology data.    Results from last 7 days   Lab Units 04/05/20  0740   WBC 10*3/mm3 25.37*   HEMOGLOBIN g/dL 14.1   HEMATOCRIT % 43.1   PLATELETS 10*3/mm3 543*   INR  1.27*     Results from last 7 days   Lab Units 04/05/20  0740   SODIUM mmol/L 134*   POTASSIUM mmol/L 4.0   CHLORIDE mmol/L 95*   CO2 mmol/L 23.0   BUN mg/dL 37*   CREATININE mg/dL 1.05*   GLUCOSE mg/dL 147*   CALCIUM mg/dL 9.5   ALT (SGPT) U/L 10   AST (SGOT) U/L 19   TROPONIN T ng/mL 0.018   PROBNP pg/mL 3,635.0*    LACTATE mmol/L 2.3*     Estimated Creatinine Clearance: 36.6 mL/min (A) (by C-G formula based on SCr of 1.05 mg/dL (H)).  Brief Urine Lab Results  (Last result in the past 365 days)      Color   Clarity   Blood   Leuk Est   Nitrite   Protein   CREAT   Urine HCG        04/05/20 0803 Yellow Cloudy Negative Small (1+) Negative 30 mg/dL (1+)             Imaging Results (Last 24 Hours)     Procedure Component Value Units Date/Time    CT Abdomen Pelvis With Contrast [[id number]] Collected:  04/05/20 0931     Updated:  04/05/20 0937    Narrative:       EXAMINATION: CT ABDOMEN PELVIS W CONTRAST-      INDICATION: Abdominal pain, unspecified generalized abdominal pain     TECHNIQUE: Multiple axial CT imaging is obtained of the abdomen  following the ministration of oral and intravenous contrast.     The radiation dose reduction device was turned on for each scan per the  ALARA (As Low as Reasonably Achievable) protocol.     COMPARISON: NONE     FINDINGS: Abdomen: The lung bases reveal new bilateral pleural effusions  and some increased markings at the lung bases bilaterally suggesting  atelectatic change. No definite signs of pneumonia. There is some free  fluid seen in the upper abdomen. Enlargement identified of the spleen  with heterogeneity. Underlying lesions within the spleen are of concern.  The liver is homogeneous. Small hilar hernia. There is mass effect  identified on the left kidney from the enlarged spleen. The gallbladder  is mildly distended with no evidence of stones. No intrapancreatic  fracture panic biliary ductal dilatation. There are dilated loops of  small bowel seen diffusely throughout the abdomen and pelvis. The oral  contrast is seen within the proximal small bowel. Findings are  concerning for partial small bowel obstruction with transition point not  well seen on the examination. There is stool seen within the colon. No  significant wall thickening identified of the bowel. There is no  abdominal  retroperitoneal lymphadenopathy.     Pelvis: Pelvic organs are unremarkable. The pelvic portion the  gastrointestinal tract reveals fluid-filled distended loops of small  bowel filling the pelvis. Findings again concerning for partial small  bowel obstruction with transition point not well seen on the  examination. There is free fluid seen within the pelvis. There is no  extraluminal air. The bony structures are unremarkable.     Delayed imaging reveals contrast seen in the renal collecting systems  bilaterally as well as within the ureters and bladder with no evidence  of obstruction.             Impression:       1. Atelectatic changes and tiny bilateral pleural effusions with no  definite pneumonia at the lung bases.  2. Findings concerning for partial small bowel obstruction with dilated  fluid-filled loops of small bowel seen diffusely throughout the abdomen  and pelvis with oral contrast only seen within the proximal portions of  the small bowel. The transition point is not well seen on the  examination. There is stool seen within the colon. There is no  significant wall thickening identified of the bowel.  3. Free fluid seen within the upper abdomen and pelvis with enlargement  of the spleen and heterogeneity of the spleen which underlying lesions  in the spleen cannot be completely excluded. No well-defined mass  identified on delayed imaging.  4. Hiatal hernia with vascular calcification and degenerative changes  seen within the bony skeleton.          XR Chest 1 View [[id number]] Collected:  04/05/20 0907     Updated:  04/05/20 0908    Narrative:          EXAMINATION: XR CHEST 1 VW-      INDICATION: Upper Abdominal Pain Triage Protocol; R10.84-Generalized  abdominal pain; I48.91-Unspecified atrial fibrillation; D72.829-Elevated  white blood cell count, unspecified; N28.9-Disorder of kidney and  ureter, unspecified; R73.9-Hyperglycemia, unspecified      COMPARISON: NONE     FINDINGS: Portable chest reveals  elevation seen of the right  hemidiaphragm. Mild increased markings in the lung bases. The upper lung  fields are clear. Degenerative changes seen within the spine. Vascular  calcification seen within the thoracic aorta.           Impression:       Lung volumes are low with mild increased markings identified  lung bases bilaterally. Minimal degenerative changes seen within the  spine with vascular calcification seen in the thoracic aorta.                   Assessment/Plan   Assessment & Plan     Active Hospital Problems    Diagnosis POA   • Generalized abdominal pain [R10.84] Yes     Abdominal pain/distention  N.p.o.  Place NG  Consult to general surgery  Concern with her splenic lesions and mass-effect on the left kidney that it also could be because of her obstruction  Continue normal saline    New A. fib with RVR  Cardizem drip started in the ER  Check echo  Check A1c and lipid panel  Cardiology consult  Chads vasc 3, is probably higher but she has not seen a doctor in 30 years.  BP elevated on admission and sugars elevated, likely has diabetes and hypertension as well  Start heparin drip  Watch BP with Cardizem drip    Splenic lesions  Concerning for malignancy with 15 pound weight loss over the last 2 months  Heme-onc to see    Sepsis  Elevated white count, question abdominal source as etiology  Doubt   Continue Zosyn  Blood cultures pending  Await urine culture      COVID-19 RISK SCREEN     1. Has the patient had close contact without PPE with a lab confirmed COVID-19 (+) person or a person under investigation (PUI) for COVID-19 infection?  -- No     2. Has the patient had respiratory symptoms, worsened/new cough and/or SOA, unexplained fever, or sudden loss of smell and/or taste in the past 7 days? --  No    3. Does the patient have baseline higher exposure risk such as working in healthcare field, currently residing in healthcare facility, or ongoing hemodialysis?  --  No       DVT prophylaxis: Heparin  drip    CODE STATUS:    Code Status and Medical Interventions:   Ordered at: 04/05/20 1034     Limited Support to NOT Include:    Cardioversion/Defibrillation    Blood Products    Antibiotics    Antiarrhythmic Drugs    Vasopressors     Code Status:    No CPR     Medical Interventions (Level of Support Prior to Arrest):    Limited       Admission Status:  I believe this patient meets INPATIENT status due to sepsis.  I feel patient’s risk for adverse outcomes and need for care warrant INPATIENT evaluation and I predict the patient’s care encounter to likely last beyond 2 midnights.  Electronically signed by Amaris Cruz DO, 04/05/20, 10:37 AM.

## 2020-04-06 NOTE — PROGRESS NOTES
Discharge Planning Assessment  [facility]     Patient Name: Baldev Canseco  MRN: [id number]  Today's Date: 4/6/2020    Admit Date: 4/5/2020    Discharge Needs Assessment    No documentation.       Discharge Plan     Row Name 04/06/20 1307       Plan    Plan  Home with [facility] care    Plan Comments  Due to patients condition information was gather from chart and Hospice CM to provide privacy for the patient and family during this time. Patient lives in [facility] with family. DME includes oxygen. Patient has portable oxygen tank. Plan is home with [facility] care. Hospice  is managing discharge planning.      Final Discharge Disposition Code  01 - home or self-care    Row Name 04/06/20 1109       Plan    Plan  Home with [facility] care    Final Discharge Disposition Code  50 - home with hospice    Final Note  Hospice referral received, chart reviewed. Visit made to pt's room, pt alert, rates abdominal pain a 5, pt's two daughters present. Daughter Lisa stated pt had recently received pain medication. Daughters stated pt's  was with hospice so family is familiar with hospice and has no questions regarding hospice services. Goals of care for pt are comfort measures. Family and pt are wanting for pt to discharge home today with hospice. Daughters want to transport pt home via private vehicle.  Spoke with Dr. Britton regarding discharge, Dr. Valladares agreeable to discharge today and will send a script for hydromorphone to [facility] for meds to bed. Portable oxygen tank brought to pt's room to use during transportation. Spoke with hospice DME regarding delivery of oxygen concentrator, was informed the concentrator will be delivered this afternoon at 3 or later, the  will call Lisa with exact time. Informed family that pt unable to discharge until the concentrator is in the home, family verbalized understanding. Family has the hospice 24 hr number to call when pt  arrives home. If can be of further assistance please call [phone].         Destination      Coordination has not been started for this encounter.      Durable Medical Equipment      Coordination has not been started for this encounter.      Dialysis/Infusion      Coordination has not been started for this encounter.      Home Medical Care      Coordination has not been started for this encounter.      Therapy      Coordination has not been started for this encounter.      Community Resources      Coordination has not been started for this encounter.        Expected Discharge Date and Time     Expected Discharge Date Expected Discharge Time    Apr 6, 2020         Demographic Summary     Row Name 04/06/20 1305       General Information    Admission Type  inpatient    Arrived From  emergency department    Referral Source  admission list    Reason for Consult  discharge planning        Functional Status    No documentation.       Psychosocial    No documentation.       Abuse/Neglect    No documentation.       Legal    No documentation.       Substance Abuse    No documentation.       Patient Forms    No documentation.           EARNEST More (Kay)  Continued Stay Note  [facility]     Patient Name: Baldev Canseco  MRN: [id number]  Today's Date: 4/6/2020    Admit Date: 4/5/2020    Discharge Plan     Row Name 04/06/20 1305       Plan    Plan  Home with [facility] care    Plan Comments  Due to patients condition information was gather from chart and Hospice CM to provide privacy for the patient and family during this time. Patient lives in [facility] with family. DME includes oxygen. Patient has portable oxygen tank. Plan is home with [facility] care. Hospice  is managing discharge planning.      Final Discharge Disposition Code  01 - home or self-care    Row Name 04/06/20 1104       Plan    Plan  Home with [facility] care    Final Discharge Disposition Code  50 - home with hospice    Final  Note  Hospice referral received, chart reviewed. Visit made to pt's room, pt alert, rates abdominal pain a 5, pt's two daughters present. Daughter Lisa stated pt had recently received pain medication. Daughters stated pt's  was with hospice so family is familiar with hospice and has no questions regarding hospice services. Goals of care for pt are comfort measures. Family and pt are wanting for pt to discharge home today with hospice. Daughters want to transport pt home via private vehicle.  Spoke with Dr. Britton regarding discharge, Dr. Valladares agreeable to discharge today and will send a script for hydromorphone to [facility] for meds to bed. Portable oxygen tank brought to pt's room to use during transportation. Spoke with hospice DME regarding delivery of oxygen concentrator, was informed the concentrator will be delivered this afternoon at 3 or later, the  will call Lisa with exact time. Informed family that pt unable to discharge until the concentrator is in the home, family verbalized understanding. Family has the hospice 24 hr number to call when pt arrives home. If can be of further assistance please call [phone].         Discharge Codes    No documentation.       Expected Discharge Date and Time     Expected Discharge Date Expected Discharge Time    Apr 6, 2020             EARNEST More (Kay)

## 2020-04-06 NOTE — DISCHARGE PLACEMENT REQUEST
"Baldev Pavon (84 y.o. Female)     Date of Birth Social Security Number Address Home Phone MRN    1935  [id number]  [facility] [phone] [id number]    Baptism Marital Status          Adventism        Admission Date Admission Type Admitting Provider Attending Provider Department, Room/Bed    20 Emergency Martha Valladares MD Anciro, Audree, MD [facility] 5G, S564/1    Discharge Date Discharge Disposition Discharge Destination                       Attending Provider:  Martha Valladares MD    Allergies:  No Known Allergies    Isolation:  None   Infection:  None   Code Status:  No CPR    Ht:  167.6 cm (66\")   Wt:  58.1 kg (128 lb)    Admission Cmt:  None   Principal Problem:  None                Active Insurance as of 2020     Primary Coverage     Payor Plan Insurance Group Employer/Plan Group    HUMANA MEDICARE REPLACEMENT HUMANA MEDICARE REPLACEMENT [id number]     Payor Plan Address Payor Plan Phone Number Payor Plan Fax Number Effective Dates    [address] [address] [phone]  2018 - None Entered    [facility] [id number]       Subscriber Name Subscriber Birth Date Member ID       BALDEV PAVON 1935 [id number]                 Emergency Contacts      (Rel.) Home Phone Work Phone Mobile Phone    BRYNN GHOSH (Daughter) -- -- [phone]            Emergency Contact Information     Name Relation Home Work Mobile    BRYNN GHOSH Daughter   [phone]          Insurance Information                HUMANA MEDICARE REPLACEMENT/HUMANA MEDICARE REPLACEMENT Phone: [phone]    Subscriber: Baldev Pavon Subscriber#: [id number]    Group#: [id number] Precert#:              History & Physical      Amaris Cruz DO at [address] [place] [address] Medicine Services  HISTORY AND PHYSICAL    Patient Name: Baldev Pavon  : 1935  MRN: [id number]  Primary Care Physician: Provider, No Known  Date of admission: " 4/5/2020      Subjective   Subjective     Chief Complaint:  Abdominal pain    HPI:  Baldev Canseco is a 84 y.o. female who has not seen a doctor since the early 1990s presented to the ER today with complaints of abdominal pain, nausea and vomiting.  The patient states that since Thursday, she had been unable to have a bowel movement and unable to eat solid food.  She was able to drink liquids.  She does not take any medications as she has not seen a doctor for 30 years.  She is also found to be in A. fib with heart rate in the 140s, that improved with fluid resuscitation.  CT abdomen pelvis shows partial small bowel obstruction with enlarged spleen.  There are lesions on the spleen with mass-effect onto her left kidney.  She denies any urinary symptoms currently, denies any fevers or chills although her white count is 25.  Urine culture and blood cultures are pending    Review of Systems   Constitutional: Positive for appetite change and unexpected weight change. Negative for chills, fatigue and fever.   HENT: Negative for postnasal drip, sneezing and sore throat.    Respiratory: Negative for chest tightness, shortness of breath and wheezing.    Cardiovascular: Negative for chest pain, palpitations and leg swelling.   Gastrointestinal: Positive for abdominal distention, abdominal pain, constipation, nausea and vomiting.   Endocrine: Negative for cold intolerance and heat intolerance.   Genitourinary: Negative for dysuria, pelvic pain and urgency.   Skin: Negative for color change and rash.   Neurological: Negative for dizziness, syncope and light-headedness.   Psychiatric/Behavioral: Negative for agitation. The patient is not nervous/anxious.         All other systems reviewed and are negative.     Personal History     History reviewed. No pertinent past medical history.    Past Surgical History:   Procedure Laterality Date   • HYSTERECTOMY         Family History: family history is not on file. Otherwise pertinent FHx  was reviewed and unremarkable.     Social History:  reports that she has never smoked. She does not have any smokeless tobacco history on file. She reports that she does not drink alcohol or use drugs.  Social History     Social History Narrative   • Not on file       Medications:  Available home medication information reviewed.    (Not in a hospital admission)    No Known Allergies    Objective   Objective     Vital Signs:   Temp:  [98 °F (36.7 °C)] 98 °F (36.7 °C)  Heart Rate:  [109-157] 118  Resp:  [16-22] 16  BP: (141-152)/() 144/89        Physical Exam   Constitutional: Awake, alert, elderly female  Eyes: PERRLA, sclerae anicteric, no conjunctival injection  HENT: NCAT, mucous membranes moist  Neck: Supple, trachea midline  Respiratory: Clear to auscultation bilaterally, nonlabored respirations   Cardiovascular: Irregular rhythm, tachycardic  Gastrointestinal: distended, tender, no guarding  Musculoskeletal: No bilateral ankle edema, no clubbing or cyanosis to extremities  Psychiatric: Appropriate affect, cooperative  Neurologic: Oriented x 3, strength symmetric in all extremities, Cranial Nerves grossly intact to confrontation, speech clear  Skin: No rashes    Results Reviewed:  I have personally reviewed current lab and radiology data.    Results from last 7 days   Lab Units 04/05/20  0740   WBC 10*3/mm3 25.37*   HEMOGLOBIN g/dL 14.1   HEMATOCRIT % 43.1   PLATELETS 10*3/mm3 543*   INR  1.27*     Results from last 7 days   Lab Units 04/05/20  0740   SODIUM mmol/L 134*   POTASSIUM mmol/L 4.0   CHLORIDE mmol/L 95*   CO2 mmol/L 23.0   BUN mg/dL 37*   CREATININE mg/dL 1.05*   GLUCOSE mg/dL 147*   CALCIUM mg/dL 9.5   ALT (SGPT) U/L 10   AST (SGOT) U/L 19   TROPONIN T ng/mL 0.018   PROBNP pg/mL 3,635.0*   LACTATE mmol/L 2.3*     Estimated Creatinine Clearance: 36.6 mL/min (A) (by C-G formula based on SCr of 1.05 mg/dL (H)).  Brief Urine Lab Results  (Last result in the past 365 days)      Color   Clarity    Blood   Leuk Est   Nitrite   Protein   CREAT   Urine HCG        04/05/20 0803 Yellow Cloudy Negative Small (1+) Negative 30 mg/dL (1+)             Imaging Results (Last 24 Hours)     Procedure Component Value Units Date/Time    CT Abdomen Pelvis With Contrast [[id number]] Collected:  04/05/20 0931     Updated:  04/05/20 0937    Narrative:       EXAMINATION: CT ABDOMEN PELVIS W CONTRAST-      INDICATION: Abdominal pain, unspecified generalized abdominal pain     TECHNIQUE: Multiple axial CT imaging is obtained of the abdomen  following the ministration of oral and intravenous contrast.     The radiation dose reduction device was turned on for each scan per the  ALARA (As Low as Reasonably Achievable) protocol.     COMPARISON: NONE     FINDINGS: Abdomen: The lung bases reveal new bilateral pleural effusions  and some increased markings at the lung bases bilaterally suggesting  atelectatic change. No definite signs of pneumonia. There is some free  fluid seen in the upper abdomen. Enlargement identified of the spleen  with heterogeneity. Underlying lesions within the spleen are of concern.  The liver is homogeneous. Small hilar hernia. There is mass effect  identified on the left kidney from the enlarged spleen. The gallbladder  is mildly distended with no evidence of stones. No intrapancreatic  fracture panic biliary ductal dilatation. There are dilated loops of  small bowel seen diffusely throughout the abdomen and pelvis. The oral  contrast is seen within the proximal small bowel. Findings are  concerning for partial small bowel obstruction with transition point not  well seen on the examination. There is stool seen within the colon. No  significant wall thickening identified of the bowel. There is no  abdominal retroperitoneal lymphadenopathy.     Pelvis: Pelvic organs are unremarkable. The pelvic portion the  gastrointestinal tract reveals fluid-filled distended loops of small  bowel filling the pelvis. Findings  again concerning for partial small  bowel obstruction with transition point not well seen on the  examination. There is free fluid seen within the pelvis. There is no  extraluminal air. The bony structures are unremarkable.     Delayed imaging reveals contrast seen in the renal collecting systems  bilaterally as well as within the ureters and bladder with no evidence  of obstruction.             Impression:       1. Atelectatic changes and tiny bilateral pleural effusions with no  definite pneumonia at the lung bases.  2. Findings concerning for partial small bowel obstruction with dilated  fluid-filled loops of small bowel seen diffusely throughout the abdomen  and pelvis with oral contrast only seen within the proximal portions of  the small bowel. The transition point is not well seen on the  examination. There is stool seen within the colon. There is no  significant wall thickening identified of the bowel.  3. Free fluid seen within the upper abdomen and pelvis with enlargement  of the spleen and heterogeneity of the spleen which underlying lesions  in the spleen cannot be completely excluded. No well-defined mass  identified on delayed imaging.  4. Hiatal hernia with vascular calcification and degenerative changes  seen within the bony skeleton.          XR Chest 1 View [[id number]] Collected:  04/05/20 0907     Updated:  04/05/20 0908    Narrative:          EXAMINATION: XR CHEST 1 VW-      INDICATION: Upper Abdominal Pain Triage Protocol; R10.84-Generalized  abdominal pain; I48.91-Unspecified atrial fibrillation; D72.829-Elevated  white blood cell count, unspecified; N28.9-Disorder of kidney and  ureter, unspecified; R73.9-Hyperglycemia, unspecified      COMPARISON: NONE     FINDINGS: Portable chest reveals elevation seen of the right  hemidiaphragm. Mild increased markings in the lung bases. The upper lung  fields are clear. Degenerative changes seen within the spine. Vascular  calcification seen within the  thoracic aorta.           Impression:       Lung volumes are low with mild increased markings identified  lung bases bilaterally. Minimal degenerative changes seen within the  spine with vascular calcification seen in the thoracic aorta.                   Assessment/Plan   Assessment & Plan     Active Hospital Problems    Diagnosis POA   • Generalized abdominal pain [R10.84] Yes     Abdominal pain/distention  N.p.o.  Place NG  Consult to general surgery  Concern with her splenic lesions and mass-effect on the left kidney that it also could be because of her obstruction  Continue normal saline    New A. fib with RVR  Cardizem drip started in the ER  Check echo  Check A1c and lipid panel  Cardiology consult  Chads vasc 3, is probably higher but she has not seen a doctor in 30 years.  BP elevated on admission and sugars elevated, likely has diabetes and hypertension as well  Start heparin drip  Watch BP with Cardizem drip    Splenic lesions  Concerning for malignancy with 15 pound weight loss over the last 2 months  Heme-onc to see    Sepsis  Elevated white count, question abdominal source as etiology  Doubt   Continue Zosyn  Blood cultures pending  Await urine culture      COVID-19 RISK SCREEN     1. Has the patient had close contact without PPE with a lab confirmed COVID-19 (+) person or a person under investigation (PUI) for COVID-19 infection?  -- No     2. Has the patient had respiratory symptoms, worsened/new cough and/or SOA, unexplained fever, or sudden loss of smell and/or taste in the past 7 days? --  No    3. Does the patient have baseline higher exposure risk such as working in healthcare field, currently residing in healthcare facility, or ongoing hemodialysis?  --  No       DVT prophylaxis: Heparin drip    CODE STATUS:    Code Status and Medical Interventions:   Ordered at: 04/05/20 1034     Limited Support to NOT Include:    Cardioversion/Defibrillation    Blood Products    Antibiotics     Antiarrhythmic Drugs    Vasopressors     Code Status:    No CPR     Medical Interventions (Level of Support Prior to Arrest):    Limited       Admission Status:  I believe this patient meets INPATIENT status due to sepsis.  I feel patient’s risk for adverse outcomes and need for care warrant INPATIENT evaluation and I predict the patient’s care encounter to likely last beyond 2 midnights.  Electronically signed by Amaris Cruz DO, 04/05/20, 10:37 AM.    Electronically signed by Amaris Cruz DO at 04/05/20 1050

## 2020-04-06 NOTE — PROCEDURES
"04/06/20  Called to evaluate pt. Pt in resp distress. Nursing not able to get pulse ox reading greater than 80% on 6L. Not able to get a good reading from pulse oximetry and ordered ABG.   SBP was in the 70s and cardizem drip was d/lauri. BP not improving. Bolus of 1L of normal saline started. Stat ABG obtained and pt was noted to have pH of 7.079, pCO2 of 31.4, pO of 65.8. Pt was admitted to [facility] and had NG tube placed. Pt in resp distress. However denies chest pain or shortness of breath. C/o abd pain and \"impending doom\". Pt case was discussed with Dr Brandon Rosales and he advised that without intubation pt would not recover and pressor support and bicarb drip would not be sufficient to improve pt out come and without intubation she would not survive. He states that her labs suggest ischemic bowel.     PE:  Gen: resp distress  Lungs: rales throughout. No wheezing.   CV: irregularly irregular   Abd: bowel sound absent except for faint BS noted briefly in the right upper quadrant.   Ext: no edema.     A/P:  -severe metabolic acidosis-Stat labs obtained. Results noted WBC is 49.7 and Lactate 8.2. PH is 7.079, pCO2 31.4, pO2 65.8. Highly suspect ischemic bowel. Pt states she does not want surgery. She refused surgical consultation she states she wants to go home. Pallative/Hospice Consulted. House supervisor contacting Hospice to coordinated  discharge home with family with hospice care and comfort measures including supplemental oxygen. Bicarb drip started.    -Hypotension-BP improved transiently with normal saline bolus. Will repeat bolus     -resp distress and hypoxemia-cancel stat CT of chest as pt has requested d/c home and requests no heroic measures.     -pt daughter Lisa was made aware of pt status and wishes and advised that we will make every attempt to discharge pt home with hospice care. Pt daughter Lisa voiced understanding. She was also made aware that due to the Covid-19 pandemic we can not permit visitors " due to the safety of the patients and the visitors.     60 minutes of critical care provided. This time excludes other billable procedures. Time does include preparation of documents, medical consultations, review of old records, and direct bedside care. Patient was at high risk for life-threatening deterioration due to severe metabolic acidosis.     Electronically signed by Marcela Marroquin DO, 04/06/20, 7:10 AM.

## 2020-04-06 NOTE — DISCHARGE SUMMARY
[facility] Medicine Services  DEATH SUMMARY    Patient Name: Baldev Canseco  : 1935  MRN: [id number]    Date of Admission: 2020  Date of Death:  2020  Time of Death:  12:45 pm    Primary Care Physician: Provider, No Known    Consults     Date and Time Order Name Status Description    2020 1044 Inpatient General Surgery Consult      2020 1034 Inpatient Hematology & Oncology Consult      2020 1034 Inpatient Cardiology Consult            Summary of Hospital Events     Presenting Problem:   Generalized abdominal pain [R10.84]    Active Hospital Problems    Diagnosis  POA   • Generalized abdominal pain [R10.84]  Yes   • Paroxysmal atrial fibrillation with rapid ventricular response (CMS/HCC) [I48.0]  Yes   • Splenic lesion [D73.9]  Unknown   • SBO (small bowel obstruction) (CMS/HCC) [K56.609]  Yes      Resolved Hospital Problems   No resolved problems to display.          Hospital Course:  Baldev Canseco was a 84 y.o. female with no significant past medical history who was admitted on 2020, for increased abdominal pain, nausea, and vomiting, she was also found to be in A. fib with RVR with heart rate into the 140s that had improved with fluid resuscitation.  Her CT abdomen and pelvis from day of admit showed partial small bowel obstruction with enlarged spleen and lesions on the spleen with mass-effect into her left kidney.  She was started on a Cardizem drip for A. fib as well as heparin.  Due to her elevated heart rate and her white blood cell count she was started on Zosyn for sepsis.  Overnight she decompensated to respiratory distress requiring 6 L nasal cannula to keep oxygen saturation greater than 80%, and systolic blood pressure was in the 70s.  Diltiazem drip was stopped and she was given a bolus of 1 L normal saline.  ABG was significant for severe metabolic acidosis with a pH of 7.07, PCO2 31.4, PO2 65.8, and lactic acid 8.2.  There was a high suspicion  for ischemic bowel.  The case was discussed with Dr. Brandon Rosales and due to patient's DNR and DNI status she was kept on the floor.  Dr. Marcela Marroquin discussed with patient and family that she would likely need surgery in order to correct this problem, however, patient did not want surgery and were requesting home hospice services.  She was placed on comfort measures.  Hospice care was set up as quickly as could be possible.  I received a call from the pathologist regarding her most recent CBC this morning and peripheral smear, that was concerning for MDS.  I discussed this with patient and family and they still wanted to pursue comfort measures and hospice care at home.  Family had planned on taking her home, because they did not want to wait for the ambulance.  Unfortunately patient  in the hospital before they could bring her home.  She was pronounced dead by Sandro Song RN, clinical house supervisor at 12:45 PM on 2020.      Official Cause of Death and any directly related diagnoses: Multisystem organ failure due to severe metabolic acidosis due to ischemic bowel and possible underlying MDS       Electronically signed by Martha Valladares MD, 20, 2:37 PM.

## 2020-04-06 NOTE — SIGNIFICANT NOTE
"Patient with elevated heart rate ranging from the 110's to 170's this shift although a sustained elevated rate was not noted.  VADIM Coley was contacted related to the patient's heart rate, who asked to continue the cardizem gtt as patient's MAP was greater than 60.  Patient tolerated cardizem drip to maximum dose of 15 mg per hour; however, the patient's heart rate continued to range between 108 to 165.  New orders were obtained and patient received Digoxin IV as ordered; see MAR for details.  Through out the night patient was noted to have poor peripheral perfusion present with cool hands and feet, dampened and flat pleth waves present for oxygen saturation level despite multiple attempts to apply different probes at various locations on the patient for accurate oxygen saturation readings.  Patient denied any shortness of air through out the night, although she had appeared tachypneic.  No cyanosis was present.  Patient wearing oxygen per nasal cannula at times but would frequently remove oxygen stating she was \"Breathing fine.\"  Patient did agree to wear the oxygen, but would continue to remove frequently requiring prompting of the staff to continue to wear the oxygen.  Patient was restless and anxious at times and asked if she could be \"Put to sleep.\"  VADIM Coley was notified of patient's anxiety and restlessness and new orders were noted.  At approximately 4 am, the patient's blood pressure was noted to be in the 80's systolic and the cardizem drip was stopped.  Within a few minutes, the SBP returned to the 90's systolic and low 100's.  Respiratory therapy at the bedside attempting to obtain a better pleth reading for oxygen saturation.  VADIM Coley notified and came to the bedside to evaluate the patient. Oxygen saturation was still to dampened for an accurate reading and the patient was placed on a non rebreather to improve saturation.  Dr. Marroquin and VADIM Coley at the patient bedside.  New orders noted for " stat labs, ABGS and IV medications including fluid bolus of 500 mls x 2.  Patient upset with having to wear oxygen and have labs drawn, but was compliant.  Family was contacted per Dr. Marroquin and provided an update of the patient's condition. Patient expressed to Dr. Marroquin of her desire to go home. Patient requested to have the non rebreather removed and Dr. Marroquin requested the patient be placed back on a nasal cannula at 4 liters then increased to 6.  Patient tolerating well and is awaiting for steps for discharge to be put in place.

## 2020-04-06 NOTE — PROGRESS NOTES
Continued Stay Note  [facility]     Patient Name: Baldev Canseco  MRN: [id number]  Today's Date: 4/6/2020    Admit Date: 4/5/2020    Discharge Plan     Row Name 04/06/20 1109       Plan    Plan  Home with [facility] care    Final Discharge Disposition Code  50 - home with hospice    Final Note  Hospice referral received, chart reviewed. Visit made to pt's room, pt alert, rates abdominal pain a 5, pt's two daughters present. Daughter Lisa stated pt had recently received pain medication. Daughters stated pt's  was with hospice so family is familiar with hospice and has no questions regarding hospice services. Goals of care for pt are comfort measures. Family and pt are wanting for pt to discharge home today with hospice. Daughters want to transport pt home via private vehicle.  Spoke with Dr. Britton regarding discharge, Dr. Valladares agreeable to discharge today and will send a script for hydromorphone to [facility] for meds to bed. Portable oxygen tank brought to pt's room to use during transportation. Spoke with hospice DME regarding delivery of oxygen concentrator, was informed the concentrator will be delivered this afternoon at 3 or later, the  will call Lisa with exact time. Informed family that pt unable to discharge until the concentrator is in the home, family verbalized understanding. Family has the hospice 24 hr number to call when pt arrives home. If can be of further assistance please call [phone].         Discharge Codes    No documentation.       Expected Discharge Date and Time     Expected Discharge Date Expected Discharge Time    Apr 6, 2020             Henrietta Laureano RN

## 2020-04-06 NOTE — PLAN OF CARE
Patient with elevated heart rate, required IV medication for rate control.  Patient with increasing restlessness, anxiety this shift,.  Patient noted to have large output per NG tube this shift, denied any nausea.  Patient required increasing amount of oxygen this shift, evaluated at bedside per physician.  Patient expressed her desire to go home despite her illness, physician aware.

## 2020-04-06 NOTE — PROGRESS NOTES
Clinical Nutrition   Reason For Visit: Identified at risk by screening criteria, MST score 2+, Reduced oral intake    Patient Name: Baldev Canseco  YOB: 1935  MRN: [id number]  Date of Encounter: 04/06/20 10:14  Admission date: 4/5/2020      RD notes plan for comfort measures and discharge home with hospice.  Please consult RD as needed.      Nutrition Assessment     Admission Problem List:  N/V/Abdominal pain  Constipation  SBO  Afib with RVR  Sepsis  Splenic lesions  Severe metabolic acidosis  Suspected ischemic bowel  Hypotension  Acute respiratory distress with hypoxia      Applicable PMH:  S/p hysterectomy  *patient has not been to the doctor since early 1990s (per H&P)       Reported/Observed/Food/Nutrition Related History   Per H&P patient reports she has not been able to consume solid food since 4/2 but has been consuming liquids. Patient also reports weight loss of 15 lbs within past 2 months.    Anthropometrics   Height: 66 in  Weight: 128 lbs (stated weight 4/5 per nsg doc)  BMI: 20.7  BMI classification: Normal: 18.5-24.9kg/m2   IBW: 130 lbs    UBW: N/A  Weight change: Unintentional weight loss of 15 lbs within the past 2 months per pt (per H&P).    Labs reviewed   Labs reviewed: Yes    Medications reviewed   Medications reviewed: Yes  Pertinent: antibiotic, dulcolax  GTT: IVF @ 125 ml/hr    Current Nutrition Prescription   PO: NPO Diet    Evaluation of Received Nutrient/Fluid Intake: NPO since admission      Nutrition Diagnosis     Problem No nutrition diagnosis at this time   Etiology Current GOC/POC   Signs/Symptoms Patient desires discharge home with hospice and comfort measures     Intervention   Intervention: Care plan reviewed    Goal:   General: Hospice care    Monitoring/Evaluation:       Monitoring/Evaluation: Per protocol  Will Continue to follow per protocol  Devi Lazo RD  Time Spent: 15 min

## 2020-04-06 NOTE — SIGNIFICANT NOTE
Exam confirms with auscultation zero audible heart tones and zero audible respirations. Ms.Jean Canseco was pronounced dead at 1245, 04/06/2020.  MD notified by Patient's RN.    Sandro Song RN  Clinical House Supervisor  4/6/2020 13:11

## 2020-04-07 LAB — BACTERIA SPEC AEROBE CULT: ABNORMAL

## 2020-04-10 LAB
BACTERIA SPEC AEROBE CULT: NORMAL
BACTERIA SPEC AEROBE CULT: NORMAL